# Patient Record
Sex: FEMALE | Race: WHITE | NOT HISPANIC OR LATINO | Employment: UNEMPLOYED | URBAN - NONMETROPOLITAN AREA
[De-identification: names, ages, dates, MRNs, and addresses within clinical notes are randomized per-mention and may not be internally consistent; named-entity substitution may affect disease eponyms.]

---

## 2017-07-11 ENCOUNTER — TELEPHONE (OUTPATIENT)
Dept: SURGERY | Facility: CLINIC | Age: 57
End: 2017-07-11

## 2017-07-14 ENCOUNTER — PREP FOR SURGERY (OUTPATIENT)
Dept: OTHER | Facility: HOSPITAL | Age: 57
End: 2017-07-14

## 2017-07-14 DIAGNOSIS — Z12.11 ENCOUNTER FOR SCREENING COLONOSCOPY: Primary | ICD-10-CM

## 2017-07-14 NOTE — TELEPHONE ENCOUNTER
Pt is scheduled for colonoscopy screening on August 14, 2017 at [facility]. Instructions mailed today. Thanks.

## 2018-01-23 ENCOUNTER — TELEPHONE (OUTPATIENT)
Dept: SURGERY | Facility: CLINIC | Age: 58
End: 2018-01-23

## 2018-01-26 ENCOUNTER — PREP FOR SURGERY (OUTPATIENT)
Dept: OTHER | Facility: HOSPITAL | Age: 58
End: 2018-01-26

## 2018-01-26 DIAGNOSIS — Z12.11 ENCOUNTER FOR SCREENING COLONOSCOPY: Primary | ICD-10-CM

## 2018-02-22 ENCOUNTER — TELEPHONE (OUTPATIENT)
Dept: SURGERY | Facility: CLINIC | Age: 58
End: 2018-02-22

## 2018-02-22 NOTE — TELEPHONE ENCOUNTER
Madeleine    ([facility] ) called saying patient needs to reschedule her surgery that is scheduled 02/26/18 due to her being sick. She will violette back and reschedule at a later date.

## 2018-03-14 ENCOUNTER — TELEPHONE (OUTPATIENT)
Dept: SURGERY | Facility: CLINIC | Age: 58
End: 2018-03-14

## 2018-04-17 ENCOUNTER — TELEPHONE (OUTPATIENT)
Dept: SURGERY | Facility: CLINIC | Age: 58
End: 2018-04-17

## 2018-04-17 RX ORDER — POLYETHYLENE GLYCOL 3350 17 G/17G
238 POWDER, FOR SOLUTION ORAL ONCE
Qty: 14 PACKET | Refills: 0 | Status: SHIPPED | OUTPATIENT
Start: 2018-04-17 | End: 2018-04-17

## 2018-04-17 RX ORDER — BISACODYL 5 MG/1
5 TABLET, DELAYED RELEASE ORAL DAILY
Qty: 4 TABLET | Refills: 0 | Status: SHIPPED | OUTPATIENT
Start: 2018-04-17

## 2018-05-14 ENCOUNTER — TELEPHONE (OUTPATIENT)
Dept: SURGERY | Facility: CLINIC | Age: 58
End: 2018-05-14

## 2018-06-08 ENCOUNTER — TELEPHONE (OUTPATIENT)
Dept: SURGERY | Facility: CLINIC | Age: 58
End: 2018-06-08

## 2018-06-25 ENCOUNTER — TELEPHONE (OUTPATIENT)
Dept: SURGERY | Facility: CLINIC | Age: 58
End: 2018-06-25

## 2018-06-25 RX ORDER — BISACODYL 5 MG/1
5 TABLET, DELAYED RELEASE ORAL DAILY PRN
Qty: 4 TABLET | Refills: 0 | Status: SHIPPED | OUTPATIENT
Start: 2018-06-25 | End: 2018-06-26

## 2018-06-25 RX ORDER — POLYETHYLENE GLYCOL 3350 17 G/17G
238 POWDER, FOR SOLUTION ORAL ONCE
Qty: 14 PACKET | Refills: 0 | Status: SHIPPED | OUTPATIENT
Start: 2018-06-25 | End: 2018-06-25

## 2018-06-25 NOTE — TELEPHONE ENCOUNTER
Patient called requesting to reschedule colonoscopy. She is down for 7/16/18 at [facility]. I will send the prep to [facility] and mail the patient instructions.

## 2018-06-26 ENCOUNTER — PREP FOR SURGERY (OUTPATIENT)
Dept: OTHER | Facility: HOSPITAL | Age: 58
End: 2018-06-26

## 2018-06-26 DIAGNOSIS — Z12.11 ENCOUNTER FOR SCREENING COLONOSCOPY: Primary | ICD-10-CM

## 2018-08-22 ENCOUNTER — TELEPHONE (OUTPATIENT)
Dept: SURGERY | Facility: CLINIC | Age: 58
End: 2018-08-22

## 2018-08-27 ENCOUNTER — TELEPHONE (OUTPATIENT)
Dept: SURGERY | Facility: CLINIC | Age: 58
End: 2018-08-27

## 2022-08-08 NOTE — TELEPHONE ENCOUNTER
I called and left another message.  
I called and left another message.  Letter mailed.  
Veronica spoke to [facility] and they said the patient wants to cancel.  I tried to call and left a message on voicemail.  
Walk in

## 2024-05-22 PROBLEM — M41.9 SCOLIOSIS: Status: ACTIVE | Noted: 2024-05-22

## 2024-05-22 PROBLEM — G62.9 NEUROPATHY: Chronic | Status: ACTIVE | Noted: 2024-05-22

## 2024-05-22 PROBLEM — M17.0 PRIMARY OSTEOARTHRITIS OF BOTH KNEES: Chronic | Status: ACTIVE | Noted: 2024-05-22

## 2024-05-22 PROBLEM — R52 PAIN MANAGEMENT: Chronic | Status: ACTIVE | Noted: 2024-05-22

## 2024-05-22 PROBLEM — Z79.899 ENCOUNTER FOR LONG-TERM (CURRENT) USE OF HIGH-RISK MEDICATION: Chronic | Status: ACTIVE | Noted: 2024-05-22

## 2024-05-22 PROBLEM — M05.79 SEROPOSITIVE RHEUMATOID ARTHRITIS OF MULTIPLE SITES: Chronic | Status: ACTIVE | Noted: 2024-05-22

## 2024-05-22 PROBLEM — R53.82 CHRONIC FATIGUE: Status: ACTIVE | Noted: 2024-05-22

## 2024-05-23 ENCOUNTER — OFFICE VISIT (OUTPATIENT)
Age: 64
End: 2024-05-23
Payer: MEDICAID

## 2024-05-23 VITALS
DIASTOLIC BLOOD PRESSURE: 82 MMHG | HEART RATE: 66 BPM | SYSTOLIC BLOOD PRESSURE: 130 MMHG | BODY MASS INDEX: 19.08 KG/M2 | TEMPERATURE: 97.9 F | HEIGHT: 68 IN | WEIGHT: 125.9 LBS

## 2024-05-23 DIAGNOSIS — Z79.899 ENCOUNTER FOR LONG-TERM (CURRENT) USE OF HIGH-RISK MEDICATION: Chronic | ICD-10-CM

## 2024-05-23 DIAGNOSIS — M05.79 SEROPOSITIVE RHEUMATOID ARTHRITIS OF MULTIPLE SITES: Primary | Chronic | ICD-10-CM

## 2024-05-23 DIAGNOSIS — R53.82 CHRONIC FATIGUE: ICD-10-CM

## 2024-05-23 DIAGNOSIS — R52 PAIN MANAGEMENT: Chronic | ICD-10-CM

## 2024-05-23 DIAGNOSIS — M41.20 OTHER IDIOPATHIC SCOLIOSIS, UNSPECIFIED SPINAL REGION: ICD-10-CM

## 2024-05-23 DIAGNOSIS — M17.0 PRIMARY OSTEOARTHRITIS OF BOTH KNEES: Chronic | ICD-10-CM

## 2024-05-23 DIAGNOSIS — G62.9 NEUROPATHY: Chronic | ICD-10-CM

## 2024-05-23 RX ORDER — TRAMADOL HYDROCHLORIDE 50 MG/1
TABLET ORAL
Qty: 180 TABLET | Refills: 2 | Status: SHIPPED | OUTPATIENT
Start: 2024-05-23

## 2024-05-23 RX ORDER — MELOXICAM 15 MG/1
15 TABLET ORAL DAILY
Qty: 90 TABLET | Refills: 1 | Status: SHIPPED | OUTPATIENT
Start: 2024-05-23

## 2024-05-23 RX ORDER — CERTOLIZUMAB PEGOL 400 MG
KIT SUBCUTANEOUS
Qty: 2 ML | Refills: 4 | Status: SHIPPED | OUTPATIENT
Start: 2024-05-23

## 2024-05-23 NOTE — ASSESSMENT & PLAN NOTE
Cimzia    - UDS ordered today 06/15/23  - Labs including CBC, CMP, ESR, and CRP every 6 months  - QTB and hepatitis panel - 12/21/2023.  Reviewed in [employer] today.

## 2024-05-23 NOTE — PROGRESS NOTES
Office Visit       Date: 05/23/2024   Patient Name: Angelika De Jesus  MRN: [id number]  YOB: 1960    Referring Physician: Zeyad Small MD     Chief Complaint:   Chief Complaint   Patient presents with    Arthritis    Med Refill       History of Present Illness: Angelika De Jesus is a 63 y.o. female who is here today with rheumatoid arthritis.  Today she reports feeling better.  She rates her pain 2 out of 10 and has 2 hours of morning stiffness.  She has had muscle spasms in her back and legs.      Subjective   Review of Systems:  Review of Systems   Musculoskeletal:  Positive for arthralgias, back pain and myalgias.   Skin:         Itching          Past Medical History:   Past Medical History:   Diagnosis Date    Back pain     Chronic fatigue     H/O high risk medication treatment     Long-term use of immunosuppressant medication     Pain management     Rheumatoid arthritis     Thoracic back pain        Past Surgical History: History reviewed. No pertinent surgical history.    Family History: History reviewed. No pertinent family history.    Social History:   Social History     Socioeconomic History    Marital status: Single   Tobacco Use    Smoking status: Never   Substance and Sexual Activity    Alcohol use: Never       Medications:   Current Outpatient Medications:     albuterol sulfate  (90 Base) MCG/ACT inhaler, Inhale 2 puffs Every 4 (Four) to 6 (Six) Hours. As needed, Disp: , Rfl:     atorvastatin (LIPITOR) 10 MG tablet, Take 1 tablet by mouth Daily., Disp: , Rfl:     bisacodyl (bisacodyl) 5 MG EC tablet, Take 1 tablet by mouth Daily., Disp: 4 tablet, Rfl: 0    Certolizumab Pegol (Cimzia) 2 X 200 MG kit injection, Inject 2mL subcutaneous every 4 weeks., Disp: 2 mL, Rfl: 4    cetirizine (ZyrTEC Allergy) 10 MG tablet, Take 1 tablet by mouth Daily., Disp: , Rfl:     Diclofenac Sodium (VOLTAREN) 1 % gel gel, Apply 2 g topically to the appropriate area as directed 2 (Two)  "Times a Day., Disp: , Rfl:     fluticasone (Flonase Allergy Relief) 50 MCG/ACT nasal spray, 1-2 sprays into the nostril(s) as directed by provider Daily., Disp: , Rfl:     gabapentin (NEURONTIN) 600 MG tablet, Take 1 tablet by mouth 3 (Three) Times a Day., Disp: , Rfl:     ipratropium (ATROVENT) 0.06 % nasal spray, 2 sprays into the nostril(s) as directed by provider 3 (Three) Times a Day. As needed, Disp: , Rfl:     lisinopril (PRINIVIL,ZESTRIL) 5 MG tablet, Take 1 tablet by mouth Daily., Disp: , Rfl:     meloxicam (MOBIC) 15 MG tablet, Take 1 tablet by mouth Daily., Disp: 90 tablet, Rfl: 1    montelukast (SINGULAIR) 10 MG tablet, Take 1 tablet by mouth Every Night., Disp: , Rfl:     multivitamin (MULTI VITAMIN PO), Take  by mouth Daily., Disp: , Rfl:     oxybutynin XL (Ditropan XL) 10 MG 24 hr tablet, Take 1 tablet by mouth Daily., Disp: , Rfl:     pantoprazole (PROTONIX) 40 MG EC tablet, Take 1 tablet by mouth Daily., Disp: , Rfl:     sertraline (Zoloft) 100 MG tablet, Take 1 tablet by mouth Daily., Disp: , Rfl:     traMADol (ULTRAM) 50 MG tablet, Take 1-2 tablets po TID prn pain, Disp: 180 tablet, Rfl: 2    Allergies: No Known Allergies    I have reviewed and updated the patient's chief complaint, history of present illness, review of systems, past medical history, surgical history, family history, social history, medications and allergy list as appropriate.     Objective    Vital Signs:   Vitals:    05/23/24 0941   BP: 130/82   BP Location: Left arm   Patient Position: Sitting   Cuff Size: Adult   Pulse: 66   Temp: 97.9 °F (36.6 °C)   TempSrc: Temporal   Weight: 57.1 kg (125 lb 14.4 oz)   Height: 172.7 cm (68\")   PainSc: 0-No pain     Body mass index is 19.14 kg/m².   BMI is within normal parameters. No other follow-up for BMI required.       Physical Exam:  Physical Exam  Vitals reviewed.   Constitutional:       Appearance: Normal appearance.   HENT:      Head: Normocephalic and atraumatic.      Mouth/Throat: "      Mouth: Mucous membranes are moist.   Eyes:      Conjunctiva/sclera: Conjunctivae normal.   Cardiovascular:      Rate and Rhythm: Normal rate and regular rhythm.      Pulses: Normal pulses.      Heart sounds: Normal heart sounds.   Pulmonary:      Effort: Pulmonary effort is normal.      Breath sounds: Normal breath sounds.   Musculoskeletal:         General: Normal range of motion.      Cervical back: Normal range of motion and neck supple.      Comments: Crepitus bilateral knees  OA changes in bilateral DIP's.     Skin:     General: Skin is warm and dry.      Comments: Spider veins upper legs   Neurological:      General: No focal deficit present.      Mental Status: She is alert and oriented to person, place, and time. Mental status is at baseline.   Psychiatric:         Mood and Affect: Mood normal.         Behavior: Behavior normal.         Thought Content: Thought content normal.         Judgment: Judgment normal.          Results Review:   Imaging Results (Last 24 Hours)       ** No results found for the last 24 hours. **            Procedures    Assessment / Plan    Assessment/Plan:   Diagnoses and all orders for this visit:    1. Seropositive rheumatoid arthritis of multiple sites (Primary)  Assessment & Plan:  DX Dr Jacob RA 2007- started pred and plaquenil. Rheumatoid Factor >100.   1/2007 MTX  6/2008 Humira and dc plaquenil  2010-Humira was from the company and she stopped methotrexate because she couldn't afford it.  Started low-dose prednisone for a short time.  Restarted mtx.  8/2010 dc Humira- rash.    Arava was stopped in 2017 due to elevated LFTS    CURRENT TREATMENT : CIMZIA subQ monthly (about 2017)    - continue Cimzia monthly at home injections.  - No synovitis today.    -Reviewed labs with [employer] that were drawn 12/21/2023.  Labs stable    Orders:  -     Certolizumab Pegol (Cimzia) 2 X 200 MG kit injection; Inject 2mL subcutaneous every 4 weeks.  Dispense: 2 mL; Refill: 4  -     meloxicam  (MOBIC) 15 MG tablet; Take 1 tablet by mouth Daily.  Dispense: 90 tablet; Refill: 1  -     traMADol (ULTRAM) 50 MG tablet; Take 1-2 tablets po TID prn pain  Dispense: 180 tablet; Refill: 2  -     CBC With Manual Differential; Future  -     Comprehensive Metabolic Panel; Future  -     C-reactive Protein; Future  -     Sedimentation Rate; Future    2. Encounter for long-term (current) use of high-risk medication  Assessment & Plan:  Cimzia    - UDS ordered today 06/15/23  - Labs including CBC, CMP, ESR, and CRP every 6 months  - QTB and hepatitis panel - 12/21/2023.  Reviewed in [employer] today.    Orders:  -     CBC With Manual Differential; Future  -     Comprehensive Metabolic Panel; Future  -     C-reactive Protein; Future  -     Sedimentation Rate; Future    3. Primary osteoarthritis of both knees  Assessment & Plan:  Left worse right  - She has moved to a one story house.  - we referred her to PT but she did not go  - continue tramadol, meloxicam, and Tylenol for pain  - Also using topical Voltaren gel    Orders:  -     meloxicam (MOBIC) 15 MG tablet; Take 1 tablet by mouth Daily.  Dispense: 90 tablet; Refill: 1  -     traMADol (ULTRAM) 50 MG tablet; Take 1-2 tablets po TID prn pain  Dispense: 180 tablet; Refill: 2    4. Pain management  Assessment & Plan:  Tramadol    UDS ordered today.  Update pain contract today with [facility]  PDMP reviewed  No signs of abuse or diversion  Will address pain management at every visit    Orders:  -     Urine Drug Screen - Urine, Clean Catch; Future    5. Neuropathy  Assessment & Plan:  ?secondary sjogrens/RA could cause some neuropathy.    Rarely, cimzia can contribute to a peripheral neuropathy  She does not have known diabetes.  She does have some DDD in her lumbar spine    - She previously was going to have an EMG with her PCP. She reports she did not ever have this done. She declined order for EMG today  - she does have a positive SSA with sicca symptoms  -  already on gabapentin  -B12 was 735 and folate greater than 20 on 12/21/2023 at last visit.  Labs reviewed today          6. Chronic fatigue  Assessment & Plan:  Fatigue continues to be a problem      7. Other idiopathic scoliosis, unspecified spinal region  Assessment & Plan:  She went to two PT appointments.  Reorder today    Orders:  -     Ambulatory Referral to Physical Therapy for Evaluation & Treatment        Follow Up:   Return in about 4 months (around 9/23/2024) for Dr. Lovelace.        LAURA Lagunas  [facility]

## 2024-05-23 NOTE — ASSESSMENT & PLAN NOTE
?secondary sjogrens/RA could cause some neuropathy.    Rarely, cimzia can contribute to a peripheral neuropathy  She does not have known diabetes.  She does have some DDD in her lumbar spine    - She previously was going to have an EMG with her PCP. She reports she did not ever have this done. She declined order for EMG today  - she does have a positive SSA with sicca symptoms  - already on gabapentin  -B12 was 735 and folate greater than 20 on 12/21/2023 at last visit.  Labs reviewed today

## 2024-05-23 NOTE — ASSESSMENT & PLAN NOTE
Tramadol    UDS ordered today.  Update pain contract today with [facility]  PDMP reviewed  No signs of abuse or diversion  Will address pain management at every visit

## 2024-05-23 NOTE — ASSESSMENT & PLAN NOTE
DX Dr Jacob RA 2007- started pred and plaquenil. Rheumatoid Factor >100.   1/2007 MTX  6/2008 Humira and dc plaquenil  2010-Humira was from the company and she stopped methotrexate because she couldn't afford it.  Started low-dose prednisone for a short time.  Restarted mtx.  8/2010 dc Humira- rash.    Arava was stopped in 2017 due to elevated LFTS    CURRENT TREATMENT : CIMZIA subQ monthly (about 2017)    - continue Cimzia monthly at home injections.  - No synovitis today.    -Reviewed labs with [employer] that were drawn 12/21/2023.  Labs stable

## 2024-05-23 NOTE — ASSESSMENT & PLAN NOTE
Left worse right  - She has moved to a one story house.  - we referred her to PT but she did not go  - continue tramadol, meloxicam, and Tylenol for pain  - Also using topical Voltaren gel

## 2024-06-05 ENCOUNTER — TELEPHONE (OUTPATIENT)
Age: 64
End: 2024-06-05
Payer: MEDICAID

## 2024-06-05 DIAGNOSIS — M05.79 SEROPOSITIVE RHEUMATOID ARTHRITIS OF MULTIPLE SITES: Chronic | ICD-10-CM

## 2024-06-07 RX ORDER — CERTOLIZUMAB PEGOL 400 MG
KIT SUBCUTANEOUS
Qty: 2 ML | Refills: 4 | Status: SHIPPED | OUTPATIENT
Start: 2024-06-07

## 2024-06-07 NOTE — TELEPHONE ENCOUNTER
Rx Refill Note  Requested Prescriptions     Pending Prescriptions Disp Refills    Certolizumab Pegol (Cimzia) 2 X 200 MG kit injection 2 mL 4     Sig: Inject 2mL subcutaneous every 4 weeks.      Last office visit with prescribing clinician: 5/23/2024   Last telemedicine visit with prescribing clinician: Visit date not found   Next office visit with prescribing clinician: Visit date not found                         Would you like a call back once the refill request has been completed: [] Yes [] No    If the office needs to give you a call back, can they leave a voicemail: [] Yes [] No    Manuela Mott MA  06/07/24, 10:17 EDT

## 2024-06-10 ENCOUNTER — TELEPHONE (OUTPATIENT)
Age: 64
End: 2024-06-10

## 2024-06-10 NOTE — TELEPHONE ENCOUNTER
ALISHA AT [facility] CALLED REGARDING THE PTS DEONA.  YOU CAN SPEAK TO ANY PHARMACISTS REGARDING THIS.  PH # [phone]

## 2024-06-11 ENCOUNTER — SPECIALTY PHARMACY (OUTPATIENT)
Age: 64
End: 2024-06-11
Payer: MEDICAID

## 2024-06-11 ENCOUNTER — TELEPHONE (OUTPATIENT)
Age: 64
End: 2024-06-11
Payer: MEDICAID

## 2024-06-11 NOTE — TELEPHONE ENCOUNTER
Called patient we can fill her Cimzia for her. I called george had them cancel all other RX. We need to double check the spelling of her name as they had two accounts. For her. Next fill 06/18/2024. Cimzia kit with $0 copay

## 2024-06-18 ENCOUNTER — SPECIALTY PHARMACY (OUTPATIENT)
Dept: GENERAL RADIOLOGY | Facility: HOSPITAL | Age: 64
End: 2024-06-18
Payer: MEDICAID

## 2024-06-18 DIAGNOSIS — M05.79 SEROPOSITIVE RHEUMATOID ARTHRITIS OF MULTIPLE SITES: Chronic | ICD-10-CM

## 2024-06-18 RX ORDER — CERTOLIZUMAB PEGOL 400 MG
KIT SUBCUTANEOUS
Qty: 2 ML | Refills: 4 | Status: SHIPPED | OUTPATIENT
Start: 2024-06-18

## 2024-06-18 NOTE — PROGRESS NOTES
Specialty Pharmacy Patient Management Program  Rheumatology Initial Assessment     Angelika De Jesus was referred by an Rheumatology provider to the Rheumatology Patient Management program offered by [facility] for Rheumatoid Arthritis on 06/18/24.  An initial outreach was conducted, including assessment of therapy appropriateness and specialty medication education for Cimzia. The patient was introduced to services offered by [facility], including: regular assessments, refill coordination, curbside pick-up or mail order delivery options, prior authorization maintenance, and financial assistance programs as applicable. The patient was also provided with contact information for the pharmacy team.     Insurance Coverage & Financial Support  [place] Medicaid     Relevant Past Medical History and Comorbidities  Relevant medical history and concomitant health conditions were discussed with the patient. The patient's chart has been reviewed for relevant past medical history and comorbid conditions and updated as necessary.  Past Medical History:   Diagnosis Date    Back pain     Chronic fatigue     H/O high risk medication treatment     Long-term use of immunosuppressant medication     Pain management     Rheumatoid arthritis     Thoracic back pain      Social History     Socioeconomic History    Marital status: Single   Tobacco Use    Smoking status: Never   Substance and Sexual Activity    Alcohol use: Never       Problem list reviewed by Jose Stanton, Clay on 6/18/2024 at  1:40 PM    Allergies  Known allergies and reactions were discussed with the patient. The patient's chart has been reviewed for  allergy information and updated as necessary.   No Known Allergies    Allergies reviewed by Jose Stanton, Clay on 6/18/2024 at  1:40 PM    Relevant Laboratory Values  Relevant laboratory values were discussed with the patient. The following specialty  "medication dose adjustment(s) are recommended: n/a    No results found for: \"HGBA1C\"  No results found for: \"GLUCOSE\", \"CALCIUM\", \"NA\", \"K\", \"CO2\", \"CL\", \"BUN\", \"CREATININE\", \"EGFRIFAFRI\", \"EGFRIFNONA\", \"BCR\", \"ANIONGAP\"  No results found for: \"CHOL\", \"CHLPL\", \"TRIG\", \"HDL\", \"LDL\", \"LDLDIRECT\"      Current Medication List  This medication list has been reviewed with the patient and evaluated for any interactions or necessary modifications/recommendations, and updated to include all prescription medications, OTC medications, and supplements the patient is currently taking.  This list reflects what is contained in the patient's profile, which has also been marked as reviewed to communicate to other providers it is the most up to date version of the patient's current medication therapy.     Current Outpatient Medications:     Certolizumab Pegol (Cimzia) 2 X 200 MG kit injection, Inject 2mL subcutaneous every 4 weeks., Disp: 2 mL, Rfl: 4    albuterol sulfate  (90 Base) MCG/ACT inhaler, Inhale 2 puffs Every 4 (Four) to 6 (Six) Hours. As needed, Disp: , Rfl:     atorvastatin (LIPITOR) 10 MG tablet, Take 1 tablet by mouth Daily., Disp: , Rfl:     bisacodyl (bisacodyl) 5 MG EC tablet, Take 1 tablet by mouth Daily., Disp: 4 tablet, Rfl: 0    cetirizine (ZyrTEC Allergy) 10 MG tablet, Take 1 tablet by mouth Daily., Disp: , Rfl:     Diclofenac Sodium (VOLTAREN) 1 % gel gel, Apply 2 g topically to the appropriate area as directed 2 (Two) Times a Day., Disp: , Rfl:     fluticasone (Flonase Allergy Relief) 50 MCG/ACT nasal spray, 1-2 sprays into the nostril(s) as directed by provider Daily., Disp: , Rfl:     gabapentin (NEURONTIN) 600 MG tablet, Take 1 tablet by mouth 3 (Three) Times a Day., Disp: , Rfl:     ipratropium (ATROVENT) 0.06 % nasal spray, 2 sprays into the nostril(s) as directed by provider 3 (Three) Times a Day. As needed, Disp: , Rfl:     lisinopril (PRINIVIL,ZESTRIL) 5 MG tablet, Take 1 tablet by mouth " Daily., Disp: , Rfl:     meloxicam (MOBIC) 15 MG tablet, Take 1 tablet by mouth Daily., Disp: 90 tablet, Rfl: 1    montelukast (SINGULAIR) 10 MG tablet, Take 1 tablet by mouth Every Night., Disp: , Rfl:     multivitamin (MULTI VITAMIN PO), Take  by mouth Daily., Disp: , Rfl:     oxybutynin XL (Ditropan XL) 10 MG 24 hr tablet, Take 1 tablet by mouth Daily., Disp: , Rfl:     pantoprazole (PROTONIX) 40 MG EC tablet, Take 1 tablet by mouth Daily., Disp: , Rfl:     sertraline (Zoloft) 100 MG tablet, Take 1 tablet by mouth Daily., Disp: , Rfl:     traMADol (ULTRAM) 50 MG tablet, Take 1-2 tablets po TID prn pain, Disp: 180 tablet, Rfl: 2    Medicines reviewed by Jose Stanton, PharmD on 6/18/2024 at  1:40 PM    Drug Interactions  N/a    Initial Education Provided for Specialty Medication  The patient has been provided with the following education and any applicable administration techniques (i.e. self-injection) have been demonstrated for the therapies indicated. All questions and concerns have been addressed prior to the patient receiving the medication, and the patient has verbalized comprehension of the education and any materials provided. Additional patient education shall be provided and documented upon request by the patient, provider, or payer.       Cimzia (certolizumab pegol)        Medication Expectations   Why am I taking this medication? You are taking this medication for plaque psoriasis, psoriatic arthritis, ankylosing spondylitis, nonradiographic axial spondyloarthritis, or Crohn's disease.   What should I expect while on this medication? You should expect a decrease in the frequency and severity of symptoms.   How does the medication work? Certolizumab pegol is a monoclonal antibody that binds to and neutralizes tumor necrosis factor alpha (TNF-alpha).   How long will I be on this medication for? The amount of time you will be on this medication will be determined by your doctor and your response  to the medication.    How do I take this medication? Take as directed on your prescription label. This medication is a subcutaneous injection given in the fatty part of the skin on the top of the thigh or stomach area. Allow to sit at room temperature for 30 minutes prior to injection.   What are some possible side effects? Injection site reactions and hypersensitivity reactions, headache, diarrhea, signs of a common cold, stomach pain, or upset stomach.   What happens if I miss a dose? If you miss a dose, take it as soon as you remember. If it is close to the time for your next dose, skip the missed dose and go back to your normal time.              Medication Safety   What are things I should warn my doctor immediately about? Allergic reaction such has hives or trouble breathing. If you develop symptoms of infection such as a cough that does not go away, weight loss, changes in how often you urinate, changes in sweating, muscle pain or weakness, or severe dizziness.     What are things that I should be cautious of? Injection site reaction and headache. You may have more chance of getting an infection.  Wash your hands often and stay away from people with infections, colds, or flu.   What are some medications that can interact with this one? Immunosuppressants and vaccines.            Medication Storage/Handling   How should I handle this medication? Keep this medication our of reach of pets/children in original container. Store in the original container to protect from light. Do not freeze. Do not inject where the skin is tender, bruised, red, hard, or has scars or stretch marks. Rotate injection sites.   How does this medication need to be stored? Store in refrigerator and keep dry. If needed, you may store at room temperature for up to 7 days but do not return to the refrigerator if unused.    How should I dispose of this medication? You can dispose of the syringe in a sharps container, and if this is not  available you may use an empty hard plastic container such as a milk jug or tide container. Discard any unused portion or if stored outside of refrigerator > 7 days.            Resources/Support   How can I remind myself to take this medication? You can download a reminder carlos on your phone or use a calandar  to help with your injection.   Is financial support available?  Yes, [facility] program can provide co-pay assisstance if you have commercial insurance or patient assistance if you have Medicare or no insurance.    Which vaccines are recommended for me? Talk to your doctor about these vaccines: Flu, Coronavirus (COVID-19), Pneumococcal (pneumonia), Tdap, Hepatitis B, Zoster (shingles).              Adherence and Self-Administration  Adherence related to the patient's specialty therapy was discussed with the patient. The Adherence segment of this outreach has been reviewed and updated.     Is there a concern with patient's ability to self administer the medication correctly and without issue?: No  Were any potential barriers to adherence identified during the initial assessment or patient education?: No  Are there any concerns regarding the patient's understanding of the importance of medication adherence?: No  Methods for Supporting Patient Adherence and/or Self-Administration: n/a    Open Medication Therapy Problems  No medication therapy recommendations to display    Goals of Therapy  Goals related to the patient's specialty therapy were discussed with the patient. The Patient Goals segment of this outreach has been reviewed and updated.   Goals Addressed Today    None       Reassessment Plan & Follow-Up  1. Medication Therapy Changes: Cimzia 400mg subq every 4 weeks  2. Related Plans, Therapy Recommendations, or Therapy Problems to Be Addressed: Patient currently on therapy and does not have any questions or concerns.  Patient transitioning to [facility] and aware she must sign for delivery.   Advised patient to call direct line with any questions or concerns. Patient next dose due on 6.25.  3. Pharmacist to perform regular assessments no more than (6) months from the previous assessment.  4. Care Coordinator to set up future refill outreaches, coordinate prescription delivery, and escalate clinical questions to pharmacist.  5. Welcome information and patient satisfaction survey to be sent by specialty pharmacy team with patient's initial fill.    Attestation  Therapeutic appropriateness: Appropriate   I attest the patient was actively involved in and has agreed to the above plan of care. If the prescribed therapy is at any point deemed not appropriate based on the current or future assessments, a consultation will be initiated with the patient's specialty care provider to determine the best course of action. The revised plan of therapy will be documented along with any required assessments and/or additional patient education provided.     Jose Stanton, PharmD  Clinical Specialty Pharmacist, Rheumatology  6/18/2024  13:45 EDT

## 2024-07-01 ENCOUNTER — SPECIALTY PHARMACY (OUTPATIENT)
Dept: GENERAL RADIOLOGY | Facility: HOSPITAL | Age: 64
End: 2024-07-01
Payer: MEDICAID

## 2024-07-01 ENCOUNTER — SPECIALTY PHARMACY (OUTPATIENT)
Age: 64
End: 2024-07-01
Payer: MEDICAID

## 2024-07-01 RX ORDER — CERTOLIZUMAB PEGOL 200 MG/ML
400 INJECTION, SOLUTION SUBCUTANEOUS
Qty: 2 ML | Refills: 2 | Status: SHIPPED | OUTPATIENT
Start: 2024-07-01

## 2024-07-11 DIAGNOSIS — M05.79 SEROPOSITIVE RHEUMATOID ARTHRITIS OF MULTIPLE SITES: Chronic | ICD-10-CM

## 2024-07-11 DIAGNOSIS — M17.0 PRIMARY OSTEOARTHRITIS OF BOTH KNEES: Chronic | ICD-10-CM

## 2024-07-11 NOTE — TELEPHONE ENCOUNTER
PT IS CALLING TO GET A REFILL ON HER TRAMADOL 50 MG    PT IS ALSO REQUESTING A REFILL ON MELOXICAM (MOBIC) 15 MG    PHARMACY IS ON FILE

## 2024-07-15 RX ORDER — TRAMADOL HYDROCHLORIDE 50 MG/1
TABLET ORAL
Qty: 180 TABLET | Refills: 2 | Status: SHIPPED | OUTPATIENT
Start: 2024-07-15

## 2024-07-15 RX ORDER — MELOXICAM 15 MG/1
15 TABLET ORAL DAILY
Qty: 90 TABLET | Refills: 1 | Status: SHIPPED | OUTPATIENT
Start: 2024-07-15

## 2024-08-16 ENCOUNTER — SPECIALTY PHARMACY (OUTPATIENT)
Age: 64
End: 2024-08-16
Payer: MEDICAID

## 2024-08-19 ENCOUNTER — TELEPHONE (OUTPATIENT)
Age: 64
End: 2024-08-19
Payer: MEDICAID

## 2024-08-21 ENCOUNTER — TELEPHONE (OUTPATIENT)
Age: 64
End: 2024-08-21
Payer: MEDICAID

## 2024-08-21 NOTE — TELEPHONE ENCOUNTER
File Link    Scan on 8/21/2024 1355 by [employer] [employer]: tramadol PA 8/19/24        Key Information    Document ID File Type Document Type Description   [id number] Image REFERRAL/PRE-AUTH CSN - SCAN tramadol PA 8/19/24     Import Information    Attached At Date Time User Dept   Encounter Level 8/21/2024  1:55 PM [employer]      Encounter    Scanned Document on 8/19/24 with [employer]

## 2024-08-23 ENCOUNTER — TELEPHONE (OUTPATIENT)
Age: 64
End: 2024-08-23
Payer: MEDICAID

## 2024-08-23 NOTE — TELEPHONE ENCOUNTER
Caller: Angelika De Jesus    Relationship to patient: Self    Best call back number: [phone]    Patient is needing: INSURANCE CALLED PT AND STATED PA WASN'T FILLED OUT CORRECTLY. PLEASE CALL [phone]  PT CALLED AGAIN AND WOULD LIKE TO SEE IF SOMETHING CAN BE SENT UNTIL THIS IS DONE.

## 2024-08-23 NOTE — TELEPHONE ENCOUNTER
Spoke with [facility] [employer] and was told that PA had been closed and we could not still send questions that had been faxed. Tried to submit new PA as was suggested by them but was unable to do in CMM. Appeal submitted in CMM answering the questions that were faxed.

## 2024-08-23 NOTE — TELEPHONE ENCOUNTER
Spoke with [facility] [employer] and was told that PA had been closed and we could not still send questions that had been faxed. Tried to submit new PA as was suggested by them but was unable to do in CMM. Appeal submitted in CMM answering the questions that were faxed.

## 2024-08-26 ENCOUNTER — TELEPHONE (OUTPATIENT)
Age: 64
End: 2024-08-26
Payer: MEDICAID

## 2024-09-03 ENCOUNTER — SPECIALTY PHARMACY (OUTPATIENT)
Age: 64
End: 2024-09-03
Payer: MEDICAID

## 2024-09-03 NOTE — PROGRESS NOTES
Specialty Pharmacy Refill Coordination Note     Angelika is a 63 y.o. female contacted today regarding refills of  ENBREL specialty medication(s).    Reviewed and verified with patient:     YES  Specialty medication(s) and dose(s) confirmed: yes    Refill Questions      Flowsheet Row Most Recent Value   Changes to allergies? No   Changes to medications? No   New conditions or infections since last clinic visit No   Unplanned office visit, urgent care, ED, or hospital admission in the last 4 weeks  No   How does patient/caregiver feel medication is working? Good   Financial problems or insurance changes  No   Since the previous refill, were any specialty medication doses or scheduled injections missed or delayed?  No   Does this patient require a clinical escalation to a pharmacist? No            Delivery Questions      Flowsheet Row Most Recent Value   Delivery method UPS   Delivery address verified with patient/caregiver? Yes   Delivery address Home   Number of medications in delivery 1   Medication(s) being filled and delivered --  [eNBREL]   Doses left of specialty medications 1 PEN   Copay verified? Yes   Copay amount 54.38   Copay form of payment Credit/debit on file   Ship Date 09/3/24   Delivery Date 09/04/24   Signature Required No                   Follow-up: 21 day(s)     Maya Marks, Pharmacy Technician  Specialty Pharmacy Technician

## 2024-09-05 ENCOUNTER — TELEPHONE (OUTPATIENT)
Age: 64
End: 2024-09-05
Payer: MEDICAID

## 2024-09-05 RX ORDER — CERTOLIZUMAB PEGOL 200 MG/ML
400 INJECTION, SOLUTION SUBCUTANEOUS
Qty: 2 ML | Refills: 2 | OUTPATIENT
Start: 2024-09-05

## 2024-09-05 NOTE — TELEPHONE ENCOUNTER
Caller: Angelika De Jesus    Relationship: Self    Best call back number: [phone]    Requested Prescriptions:   Requested Prescriptions     Pending Prescriptions Disp Refills    Certolizumab Pegol (Cimzia, 2 Syringe,) 200 MG/ML Prefilled Syringe Kit injection 2 mL 2     Sig: Inject 2 mL under the skin into the appropriate area as directed Every 28 (Twenty-Eight) Days.        Pharmacy where request should be sent:    [facility]  [address] [place] [address], [place] [address]  Phone: [phone]  Fax: [phone]     Last office visit with prescribing clinician: Visit date not found   Last telemedicine visit with prescribing clinician: Visit date not found   Next office visit with prescribing clinician: 9/26/2024     Additional details provided by patient: PT IS COMPLETELY OUT    Does the patient have less than a 3 day supply:  [x] Yes  [] No    Would you like a call back once the refill request has been completed: [] Yes [] No    If the office needs to give you a call back, can they leave a voicemail: [] Yes [] No    Yordy Julio Rep   09/05/24 10:39 EDT

## 2024-09-05 NOTE — TELEPHONE ENCOUNTER
Patient already has refills. [facility] is attempting to get ahold of the patient to schedule her refill to be shipped.    Susanne Matias, PharmD, BCPS  Clinical Specialty Pharmacist, Rheumatology   9/5/2024  10:57 EDT

## 2024-09-05 NOTE — TELEPHONE ENCOUNTER
We have tried contacting pt since 7/29/24 regarding her Cimzia refill. Her phone number is not a working number. We have also contacted and left messages with her daughter to let her know we couldn't get in touch with the patient. We tried 7/29, 7/31, 8/2, 8/5, 8/6, 8/7, 8/8, 8/26, 8/28 and 9/5. If patient calls back - please let her know that we cannot get a hold of her at the [phone] number.

## 2024-09-11 NOTE — TELEPHONE ENCOUNTER
We have tried contacting pt since 7/29/24 regarding her Cimzia refill. Her phone number is not a working number. We have also contacted and left messages with her daughter to let her know we couldn't get in touch with the patient. We tried 7/29, 7/31, 8/2, 8/5, 8/6, 8/7, 8/8, 8/26, 8/28, 9/5 and 9/11.  If patient calls back - please let her know that we cannot get a hold of her at the [phone] number.

## 2024-09-12 ENCOUNTER — TELEPHONE (OUTPATIENT)
Age: 64
End: 2024-09-12
Payer: MEDICAID

## 2024-09-12 RX ORDER — CERTOLIZUMAB PEGOL 200 MG/ML
400 INJECTION, SOLUTION SUBCUTANEOUS
Qty: 2 ML | Refills: 2 | Status: SHIPPED | OUTPATIENT
Start: 2024-09-12 | End: 2024-09-13 | Stop reason: SDUPTHER

## 2024-09-13 ENCOUNTER — SPECIALTY PHARMACY (OUTPATIENT)
Dept: GENERAL RADIOLOGY | Facility: HOSPITAL | Age: 64
End: 2024-09-13
Payer: MEDICAID

## 2024-09-13 ENCOUNTER — SPECIALTY PHARMACY (OUTPATIENT)
Age: 64
End: 2024-09-13
Payer: MEDICAID

## 2024-09-13 ENCOUNTER — TELEPHONE (OUTPATIENT)
Age: 64
End: 2024-09-13
Payer: MEDICAID

## 2024-09-13 RX ORDER — CERTOLIZUMAB PEGOL 200 MG/ML
400 INJECTION, SOLUTION SUBCUTANEOUS
Qty: 2 ML | Refills: 2 | Status: SHIPPED | OUTPATIENT
Start: 2024-09-13

## 2024-09-13 NOTE — PROGRESS NOTES
Specialty Pharmacy Refill Coordination Note     Jessica is a 63 y.o. female contacted today regarding refills of  Cimzia specialty medication(s).    Reviewed and verified with patient:     YES  Specialty medication(s) and dose(s) confirmed: yes    Refill Questions      Flowsheet Row Most Recent Value   Changes to allergies? No   Changes to medications? No   New conditions or infections since last clinic visit No   Unplanned office visit, urgent care, ED, or hospital admission in the last 4 weeks  No   How does patient/caregiver feel medication is working? Good   Financial problems or insurance changes  No   Since the previous refill, were any specialty medication doses or scheduled injections missed or delayed?  No   Does this patient require a clinical escalation to a pharmacist? No            Delivery Questions      Flowsheet Row Most Recent Value   Delivery method UPS   Delivery address verified with patient/caregiver? Yes   Delivery address Home   Number of medications in delivery 0   Doses left of specialty medications 0   Copay verified? Yes   Copay amount 0   Copay form of payment No copayment ($0)   Ship Date 09/16   Delivery Date 09/17   Signature Required Yes                   Follow-up: 21 day(s)     Maya Marks, Pharmacy Technician  Specialty Pharmacy Technician

## 2024-09-13 NOTE — PROGRESS NOTES
Specialty Pharmacy Patient Management Program  Per Protocol Prescription Order/Refill     Patient currently fills medications at [facility] and is enrolled in an Rheumatology Patient Management Program.     Requested Prescriptions     Pending Prescriptions Disp Refills    Certolizumab Pegol (Cimzia, 2 Syringe,) 200 MG/ML Prefilled Syringe Kit injection 2 mL 2     Sig: Inject 2 mL under the skin into the appropriate area as directed Every 28 (Twenty-Eight) Days.     Prescription orders above were sent to the pharmacy per Collaborative Care Agreement Protocol.     Addison Pierce, PharmD  Clinical Specialty Pharmacist, Endocrinology  9/13/2024  08:10 EDT

## 2024-09-23 ENCOUNTER — TELEPHONE (OUTPATIENT)
Age: 64
End: 2024-09-23
Payer: MEDICAID

## 2024-09-27 ENCOUNTER — TELEPHONE (OUTPATIENT)
Age: 64
End: 2024-09-27
Payer: MEDICAID

## 2024-10-01 ENCOUNTER — TELEPHONE (OUTPATIENT)
Age: 64
End: 2024-10-01
Payer: MEDICAID

## 2024-10-01 RX ORDER — BACLOFEN 10 MG/1
10 TABLET ORAL 3 TIMES DAILY PRN
Qty: 90 TABLET | Refills: 5 | Status: SHIPPED | OUTPATIENT
Start: 2024-10-01

## 2024-10-01 NOTE — TELEPHONE ENCOUNTER
[facility] pharmacy called to clarify instructions for baclofen as there were conflicting directions. I clarified that per [facility] it should be TID.

## 2024-10-01 NOTE — TELEPHONE ENCOUNTER
Pt is requesting a muscle relaxer for her back. She states she is having a lot of pain in her back

## 2024-10-08 ENCOUNTER — SPECIALTY PHARMACY (OUTPATIENT)
Age: 64
End: 2024-10-08
Payer: MEDICAID

## 2024-10-08 NOTE — PROGRESS NOTES
Specialty Pharmacy Refill Coordination Note     Jessica is a 63 y.o. female contacted today regarding refills of  Cimzia specialty medication(s).    Reviewed and verified with patient:     YES  Specialty medication(s) and dose(s) confirmed: yes    Refill Questions      Flowsheet Row Most Recent Value   Changes to allergies? No   Changes to medications? No   New conditions or infections since last clinic visit No   Unplanned office visit, urgent care, ED, or hospital admission in the last 4 weeks  No   How does patient/caregiver feel medication is working? Good   Financial problems or insurance changes  No   Since the previous refill, were any specialty medication doses or scheduled injections missed or delayed?  No   Does this patient require a clinical escalation to a pharmacist? No            Delivery Questions      Flowsheet Row Most Recent Value   Delivery method UPS   Delivery address verified with patient/caregiver? Yes   Delivery address Home   Number of medications in delivery 1   Medication(s) being filled and delivered Certolizumab Pegol   Doses left of specialty medications 0   Copay verified? Yes   Copay amount 0   Copay form of payment No copayment ($0)   Ship Date 10/8   Delivery Date 10/9   Signature Required No                   Follow-up: 21 day(s)     Maya Marks, Pharmacy Technician  Specialty Pharmacy Technician

## 2024-10-15 DIAGNOSIS — M05.79 SEROPOSITIVE RHEUMATOID ARTHRITIS OF MULTIPLE SITES: Chronic | ICD-10-CM

## 2024-10-15 DIAGNOSIS — M17.0 PRIMARY OSTEOARTHRITIS OF BOTH KNEES: Chronic | ICD-10-CM

## 2024-10-15 RX ORDER — MELOXICAM 15 MG/1
15 TABLET ORAL DAILY
Qty: 90 TABLET | Refills: 1 | Status: SHIPPED | OUTPATIENT
Start: 2024-10-15

## 2024-10-15 RX ORDER — TRAMADOL HYDROCHLORIDE 50 MG/1
TABLET ORAL
Qty: 180 TABLET | Refills: 2 | Status: SHIPPED | OUTPATIENT
Start: 2024-10-15

## 2024-10-22 ENCOUNTER — TELEPHONE (OUTPATIENT)
Age: 64
End: 2024-10-22
Payer: MEDICAID

## 2024-10-22 NOTE — TELEPHONE ENCOUNTER
Pt. Called checking on and update on her Tramadol appeal. I let her know that we are still waiting.

## 2024-10-31 ENCOUNTER — SPECIALTY PHARMACY (OUTPATIENT)
Age: 64
End: 2024-10-31
Payer: MEDICAID

## 2024-10-31 NOTE — PROGRESS NOTES
Specialty Pharmacy Refill Coordination Note     Jessica is a 63 y.o. female contacted today regarding refills of  Cimzia specialty medication(s).    Reviewed and verified with patient:     YES  Specialty medication(s) and dose(s) confirmed: yes    Refill Questions      Flowsheet Row Most Recent Value   Changes to allergies? No   Changes to medications? No   New conditions or infections since last clinic visit No   Unplanned office visit, urgent care, ED, or hospital admission in the last 4 weeks  No   How does patient/caregiver feel medication is working? Good   Financial problems or insurance changes  No   Since the previous refill, were any specialty medication doses or scheduled injections missed or delayed?  No   Does this patient require a clinical escalation to a pharmacist? No            Delivery Questions      Flowsheet Row Most Recent Value   Delivery method UPS   Delivery address verified with patient/caregiver? Yes   Delivery address Home   Number of medications in delivery 1   Medication(s) being filled and delivered Certolizumab Pegol (Cimzia (2 Syringe))   Copay verified? Yes   Copay amount 0   Copay form of payment No copayment ($0)   Ship Date 10/31   Delivery Date 11/1   Signature Required Yes                   Follow-up: 21 day(s)     Maya Marks, Pharmacy Technician  Specialty Pharmacy Technician

## 2024-11-05 NOTE — ASSESSMENT & PLAN NOTE
She went to two PT appointments.  We reordered PT for her and she did not received a phone call.  We will re-order.  She also was squatted down several weeks ago and her dog knocked her over.  Her low back has been sore since then.  We will have PT work on this as well.  Likely a muscle strain.  Baclofen has been helpful.

## 2024-11-05 NOTE — ASSESSMENT & PLAN NOTE
Cimzia      - Labs including CBC, CMP, ESR, and CRP every 6 months  - QTB and hepatitis panel - 12/21/2023.  [facility]ed in [facility] today.

## 2024-11-05 NOTE — ASSESSMENT & PLAN NOTE
Tramadol    UDS up to date 9/18/24  Pain contract reviewed and signed 5/2024  PDMP reviewed  No signs of abuse or diversion  Will address pain management at every visit

## 2024-11-05 NOTE — ASSESSMENT & PLAN NOTE
DX Dr Jacob RA 2007- started pred and plaquenil. Rheumatoid Factor >100.   1/2007 MTX  6/2008 Humira and dc plaquenil  2010-Humira was from the company and she stopped methotrexate because she couldn't afford it.  Started low-dose prednisone for a short time.  Restarted mtx.  8/2010 dc Humira- rash.    Arava was stopped in 2017 due to elevated LFTS    CURRENT TREATMENT : CIMZIA subQ monthly (about 2017)    - continue Cimzia monthly at home injections.  - No synovitis today.    - Pain score driven by back pain from recently being knocked down by dog.

## 2024-11-05 NOTE — ASSESSMENT & PLAN NOTE
?secondary sjogrens/RA could cause some neuropathy.    Rarely, cimzia can contribute to a peripheral neuropathy  She does not have known diabetes.  She does have some DDD in her lumbar spine    - She previously was going to have an EMG with her PCP. She reports she did not ever have this done. She declined order for EMG today  - she does have a positive SSA with sicca symptoms  - already on gabapentin  -B12 was 735 and folate greater than 20 on 12/21/2023 at last visit.

## 2024-11-05 NOTE — ASSESSMENT & PLAN NOTE
Left worse right  - She has moved to a one story house.  - we referred her to PT but she did not go  - continue tramadol, meloxicam, and Tylenol for pain.  - Tramadol has been denied by her insurance.  She has been paying cash price.  - Also using topical Voltaren gel

## 2024-11-06 ENCOUNTER — OFFICE VISIT (OUTPATIENT)
Age: 64
End: 2024-11-06
Payer: MEDICAID

## 2024-11-06 ENCOUNTER — TELEPHONE (OUTPATIENT)
Age: 64
End: 2024-11-06

## 2024-11-06 VITALS
BODY MASS INDEX: 18.49 KG/M2 | DIASTOLIC BLOOD PRESSURE: 78 MMHG | WEIGHT: 122 LBS | SYSTOLIC BLOOD PRESSURE: 102 MMHG | TEMPERATURE: 97.7 F | HEIGHT: 68 IN | HEART RATE: 74 BPM

## 2024-11-06 DIAGNOSIS — M05.79 SEROPOSITIVE RHEUMATOID ARTHRITIS OF MULTIPLE SITES: Primary | Chronic | ICD-10-CM

## 2024-11-06 DIAGNOSIS — M17.0 PRIMARY OSTEOARTHRITIS OF BOTH KNEES: Chronic | ICD-10-CM

## 2024-11-06 DIAGNOSIS — G62.9 NEUROPATHY: Chronic | ICD-10-CM

## 2024-11-06 DIAGNOSIS — R52 PAIN MANAGEMENT: Chronic | ICD-10-CM

## 2024-11-06 DIAGNOSIS — R53.82 CHRONIC FATIGUE: ICD-10-CM

## 2024-11-06 DIAGNOSIS — Z79.899 ENCOUNTER FOR LONG-TERM (CURRENT) USE OF HIGH-RISK MEDICATION: Chronic | ICD-10-CM

## 2024-11-06 DIAGNOSIS — M41.20 OTHER IDIOPATHIC SCOLIOSIS, UNSPECIFIED SPINAL REGION: ICD-10-CM

## 2024-11-06 RX ORDER — BACLOFEN 10 MG/1
10 TABLET ORAL 3 TIMES DAILY PRN
Qty: 90 TABLET | Refills: 5 | Status: SHIPPED | OUTPATIENT
Start: 2024-11-06 | End: 2024-11-06

## 2024-11-06 RX ORDER — TRAMADOL HYDROCHLORIDE 50 MG/1
TABLET ORAL
Qty: 180 TABLET | Refills: 2 | Status: SHIPPED | OUTPATIENT
Start: 2024-11-06 | End: 2024-11-06

## 2024-11-06 RX ORDER — MELOXICAM 15 MG/1
15 TABLET ORAL DAILY
Qty: 90 TABLET | Refills: 1 | Status: SHIPPED | OUTPATIENT
Start: 2024-11-06 | End: 2024-11-06

## 2024-11-06 RX ORDER — LISINOPRIL 10 MG/1
10 TABLET ORAL EVERY 12 HOURS
COMMUNITY
Start: 2024-08-20

## 2024-11-06 RX ORDER — TRAMADOL HYDROCHLORIDE 50 MG/1
TABLET ORAL
Qty: 180 TABLET | Refills: 2 | Status: SHIPPED | OUTPATIENT
Start: 2024-11-06

## 2024-11-06 RX ORDER — MELOXICAM 15 MG/1
15 TABLET ORAL DAILY
Qty: 90 TABLET | Refills: 1 | Status: SHIPPED | OUTPATIENT
Start: 2024-11-06

## 2024-11-06 RX ORDER — BACLOFEN 10 MG/1
10 TABLET ORAL 3 TIMES DAILY PRN
Qty: 90 TABLET | Refills: 5 | Status: SHIPPED | OUTPATIENT
Start: 2024-11-06

## 2024-11-06 RX ORDER — BACLOFEN 10 MG/1
10 TABLET ORAL 3 TIMES DAILY PRN
Qty: 90 TABLET | Refills: 5 | Status: SHIPPED | OUTPATIENT
Start: 2024-11-06 | End: 2024-11-06 | Stop reason: SDUPTHER

## 2024-11-06 NOTE — PROGRESS NOTES
Office Visit       Date: 11/06/2024   Patient Name: Jessica De Jesus  MRN: [id number]  YOB: 1960    Referring Physician: Zeyad Small MD     Chief Complaint:   Chief Complaint   Patient presents with   • Rheumatoid Arthritis     Follow up       History of Present Illness: Jessica De Jesus is a 63 y.o. female who is here today for follow-up of rheumatoid arthritis.  Today she rates her pain 9 out of 10 and has 4 hours of morning stiffness.  She reports feeling worse.  We prescribed her Cimzia,  Voltaren gel, meloxicam 15 mg daily and tramadol    3 times daily as needed joint pain mg as well.      Subjective   Review of Systems:  Positive for eye itching, dry eyes, memory problems, neck pain and back pain.  All other review of symptoms are negative.    Past Medical History:   Past Medical History:   Diagnosis Date   • Back pain    • Chronic fatigue    • H/O high risk medication treatment    • Long-term use of immunosuppressant medication    • Pain management    • Rheumatoid arthritis    • Thoracic back pain        Past Surgical History: History reviewed. No pertinent surgical history.    Family History: History reviewed. No pertinent family history.    Social History:   Social History     Socioeconomic History   • Marital status: Single   Tobacco Use   • Smoking status: Never   • Smokeless tobacco: Never   Vaping Use   • Vaping status: Never Used   Substance and Sexual Activity   • Alcohol use: Never   • Drug use: Never   • Sexual activity: Defer       Medications:   Current Outpatient Medications:   •  albuterol sulfate  (90 Base) MCG/ACT inhaler, Inhale 2 puffs Every 4 (Four) to 6 (Six) Hours. As needed, Disp: , Rfl:   •  atorvastatin (LIPITOR) 10 MG tablet, Take 1 tablet by mouth Daily., Disp: , Rfl:   •  baclofen (LIORESAL) 10 MG tablet, Take 1 tablet by mouth 3 (Three) Times a Day As Needed for Muscle Spasms. Take 1 tablet by oral route 4 times every day PRN, Disp: 90  tablet, Rfl: 5  •  bisacodyl (bisacodyl) 5 MG EC tablet, Take 1 tablet by mouth Daily., Disp: 4 tablet, Rfl: 0  •  Certolizumab Pegol (Cimzia, 2 Syringe,) 200 MG/ML Prefilled Syringe Kit injection, Inject 2 mL under the skin into the appropriate area as directed Every 28 (Twenty-Eight) Days., Disp: 2 mL, Rfl: 2  •  cetirizine (ZyrTEC Allergy) 10 MG tablet, Take 1 tablet by mouth Daily., Disp: , Rfl:   •  Diclofenac Sodium (VOLTAREN) 1 % gel gel, Apply 2 g topically to the appropriate area as directed 2 (Two) Times a Day., Disp: 300 g, Rfl: 2  •  fluticasone (Flonase Allergy Relief) 50 MCG/ACT nasal spray, Administer 1-2 sprays into the nostril(s) as directed by provider Daily., Disp: , Rfl:   •  gabapentin (NEURONTIN) 600 MG tablet, Take 1 tablet by mouth 3 (Three) Times a Day., Disp: , Rfl:   •  ipratropium (ATROVENT) 0.06 % nasal spray, Administer 2 sprays into the nostril(s) as directed by provider 3 (Three) Times a Day. As needed, Disp: , Rfl:   •  lisinopril (PRINIVIL,ZESTRIL) 10 MG tablet, Take 1 tablet by mouth Every 12 (Twelve) Hours., Disp: , Rfl:   •  meloxicam (MOBIC) 15 MG tablet, Take 1 tablet by mouth Daily., Disp: 90 tablet, Rfl: 1  •  montelukast (SINGULAIR) 10 MG tablet, Take 1 tablet by mouth Every Night., Disp: , Rfl:   •  multivitamin (MULTI VITAMIN PO), Take  by mouth Daily., Disp: , Rfl:   •  oxybutynin XL (Ditropan XL) 10 MG 24 hr tablet, Take 1 tablet by mouth Daily., Disp: , Rfl:   •  pantoprazole (PROTONIX) 40 MG EC tablet, Take 1 tablet by mouth Daily., Disp: , Rfl:   •  sertraline (Zoloft) 100 MG tablet, Take 1 tablet by mouth Daily., Disp: , Rfl:   •  traMADol (ULTRAM) 50 MG tablet, Take 1-2 tablets po TID prn pain, Disp: 180 tablet, Rfl: 2    Allergies: No Known Allergies    I have reviewed and updated the patient's chief complaint, history of present illness, review of systems, past medical history, surgical history, family history, social history, medications and allergy list as  "appropriate.     Objective    Vital Signs:   Vitals:    11/06/24 1109   BP: 102/78   BP Location: Right arm   Pulse: 74   Temp: 97.7 °F (36.5 °C)   Weight: 55.3 kg (122 lb)   Height: 172.7 cm (68\")   PainSc:   9   PainLoc: Back     Body mass index is 18.55 kg/m².   BMI is within normal parameters. No other follow-up for BMI required.       Physical Exam:  Physical Exam  Vitals reviewed.   Constitutional:       Appearance: Normal appearance.   HENT:      Head: Normocephalic and atraumatic.      Mouth/Throat:      Mouth: Mucous membranes are moist.   Eyes:      Conjunctiva/sclera: Conjunctivae normal.   Cardiovascular:      Rate and Rhythm: Normal rate and regular rhythm.      Pulses: Normal pulses.      Heart sounds: Normal heart sounds.   Pulmonary:      Effort: Pulmonary effort is normal.      Breath sounds: Normal breath sounds.   Musculoskeletal:         General: Normal range of motion.      Cervical back: Normal range of motion and neck supple.      Comments: Tender paraspinal muscles at lumbar spine with 75% flexion and extension  Scoliosis to the right  Heberden's right hand  Crepitus bilateral knees  No synovitis   Skin:     General: Skin is warm and dry.   Neurological:      General: No focal deficit present.      Mental Status: She is alert and oriented to person, place, and time. Mental status is at baseline.   Psychiatric:         Mood and Affect: Mood normal.         Behavior: Behavior normal.         Thought Content: Thought content normal.         Judgment: Judgment normal.          Results Review:   Imaging Results (Last 24 Hours)       ** No results found for the last 24 hours. **            Procedures    Assessment / Plan    Assessment/Plan:   Diagnoses and all orders for this visit:    1. Seropositive rheumatoid arthritis of multiple sites (Primary)  Assessment & Plan:  DX Dr Nidia DYSON 2007- started pred and plaquenil. Rheumatoid Factor >100.   1/2007 MTX  6/2008 Humira and dc plaquenil  2010-Humira " was from the company and she stopped methotrexate because she couldn't afford it.  Started low-dose prednisone for a short time.  Restarted mtx.  8/2010 dc Humira- rash.    Arava was stopped in 2017 due to elevated LFTS    CURRENT TREATMENT : CIMZIA subQ monthly (about 2017)    - continue Cimzia monthly at home injections.  - No synovitis today.    - Pain score driven by back pain from recently being knocked down by dog.    Orders:  -     CBC With Manual Differential; Future  -     Comprehensive Metabolic Panel; Future  -     C-reactive Protein; Future  -     Sedimentation Rate; Future  -     Discontinue: traMADol (ULTRAM) 50 MG tablet; Take 1-2 tablets po TID prn pain  Dispense: 180 tablet; Refill: 2  -     Discontinue: meloxicam (MOBIC) 15 MG tablet; Take 1 tablet by mouth Daily.  Dispense: 90 tablet; Refill: 1  -     meloxicam (MOBIC) 15 MG tablet; Take 1 tablet by mouth Daily.  Dispense: 90 tablet; Refill: 1  -     traMADol (ULTRAM) 50 MG tablet; Take 1-2 tablets po TID prn pain  Dispense: 180 tablet; Refill: 2    2. Primary osteoarthritis of both knees  Assessment & Plan:  Left worse right  - She has moved to a one story house.  - we referred her to PT but she did not go  - continue tramadol, meloxicam, and Tylenol for pain.  - Tramadol has been denied by her insurance.  She has been paying cash price.  - Also using topical Voltaren gel    Orders:  -     Discontinue: traMADol (ULTRAM) 50 MG tablet; Take 1-2 tablets po TID prn pain  Dispense: 180 tablet; Refill: 2  -     Discontinue: meloxicam (MOBIC) 15 MG tablet; Take 1 tablet by mouth Daily.  Dispense: 90 tablet; Refill: 1  -     meloxicam (MOBIC) 15 MG tablet; Take 1 tablet by mouth Daily.  Dispense: 90 tablet; Refill: 1  -     traMADol (ULTRAM) 50 MG tablet; Take 1-2 tablets po TID prn pain  Dispense: 180 tablet; Refill: 2    3. Other idiopathic scoliosis, unspecified spinal region  Assessment & Plan:  She went to two PT appointments.  We reordered PT for  her and she did not received a phone call.  We will re-order.  She also was squatted down several weeks ago and her dog knocked her over.  Her low back has been sore since then.  We will have PT work on this as well.  Likely a muscle strain.  Baclofen has been helpful.    Orders:  -     Ambulatory Referral to Physical Therapy for Evaluation & Treatment    4. Pain management  Assessment & Plan:  Tramadol    UDS up to date 9/18/24  Pain contract reviewed and signed 5/2024  PDMP reviewed  No signs of abuse or diversion  Will address pain management at every visit      5. Neuropathy  Assessment & Plan:  ?secondary sjogrens/RA could cause some neuropathy.    Rarely, cimzia can contribute to a peripheral neuropathy  She does not have known diabetes.  She does have some DDD in her lumbar spine    - She previously was going to have an EMG with her PCP. She reports she did not ever have this done. She declined order for EMG today  - she does have a positive SSA with sicca symptoms  - already on gabapentin  -B12 was 735 and folate greater than 20 on 12/21/2023 at last visit.        6. Encounter for long-term (current) use of high-risk medication  Assessment & Plan:  Cimzia      - Labs including CBC, CMP, ESR, and CRP every 6 months  - QTB and hepatitis panel - 12/21/2023.  Reviewed in [facility] today.    Orders:  -     CBC With Manual Differential; Future  -     Comprehensive Metabolic Panel; Future  -     C-reactive Protein; Future  -     Sedimentation Rate; Future    7. Chronic fatigue  Assessment & Plan:  Fatigue continues to be a problem      Other orders  -     Discontinue: Diclofenac Sodium (VOLTAREN) 1 % gel gel; Apply 2 g topically to the appropriate area as directed 2 (Two) Times a Day.  Dispense: 300 g; Refill: 2  -     Discontinue: baclofen (LIORESAL) 10 MG tablet; Take 1 tablet by mouth 3 (Three) Times a Day As Needed for Muscle Spasms. Take 1 tablet by oral route 4 times every day PRN  Dispense: 90 tablet; Refill:  5  -     baclofen (LIORESAL) 10 MG tablet; Take 1 tablet by mouth 3 (Three) Times a Day As Needed for Muscle Spasms. Take 1 tablet by oral route 4 times every day PRN  Dispense: 90 tablet; Refill: 5  -     Diclofenac Sodium (VOLTAREN) 1 % gel gel; Apply 2 g topically to the appropriate area as directed 2 (Two) Times a Day.  Dispense: 300 g; Refill: 2        Follow Up:   Return in about 4 months (around 3/6/2025) for Dr. Lovelace.        LAURA Lagunas  [facility]

## 2024-11-06 NOTE — TELEPHONE ENCOUNTER
Pharmacy needing clarification for RX baclofen.. per chart note sent in correction to [facility]

## 2024-11-07 RX ORDER — CERTOLIZUMAB PEGOL 200 MG/ML
400 INJECTION, SOLUTION SUBCUTANEOUS
Qty: 2 ML | Refills: 5 | Status: SHIPPED | OUTPATIENT
Start: 2024-11-07

## 2024-11-07 NOTE — TELEPHONE ENCOUNTER
Specialty Pharmacy Patient Management Program  Per Protocol Prescription Order/Refill     Patient currently fills medications at [facility] and is enrolled in an Rheumatology Patient Management Program.     Requested Prescriptions     Signed Prescriptions Disp Refills    Certolizumab Pegol (Cimzia, 2 Syringe,) 200 MG/ML Prefilled Syringe Kit injection 2 mL 5     Sig: Inject 2 mL under the skin into the appropriate area as directed Every 28 (Twenty-Eight) Days.     Authorizing Provider: EARLE CHIU     Ordering User: ALEXI HAMMOND     Prescription orders above were sent to the pharmacy per Collaborative Care Agreement Protocol.

## 2024-11-22 ENCOUNTER — SPECIALTY PHARMACY (OUTPATIENT)
Age: 64
End: 2024-11-22
Payer: MEDICAID

## 2024-11-22 NOTE — PROGRESS NOTES
Specialty Pharmacy Refill Coordination Note     Jessica is a 63 y.o. female contacted today regarding refills of  Cimzia specialty medication(s).    Reviewed and verified with patient:       Specialty medication(s) and dose(s) confirmed: yes    Refill Questions      Flowsheet Row Most Recent Value   Changes to allergies? No   Changes to medications? No   New conditions or infections since last clinic visit No   Unplanned office visit, urgent care, ED, or hospital admission in the last 4 weeks  No   How does patient/caregiver feel medication is working? Good   Financial problems or insurance changes  No   Since the previous refill, were any specialty medication doses or scheduled injections missed or delayed?  No   Does this patient require a clinical escalation to a pharmacist? No            Delivery Questions      Flowsheet Row Most Recent Value   Delivery method UPS   Delivery address verified with patient/caregiver? Yes   Delivery address Home   Number of medications in delivery 1   Medication(s) being filled and delivered Certolizumab Pegol (Cimzia (2 Syringe))   Copay verified? Yes   Copay amount 0   Copay form of payment No copayment ($0)   Ship Date 11/25   Delivery Date 11/26   Signature Required No                   Follow-up: 21 day(s)     Maya Marks, Pharmacy Technician  Specialty Pharmacy Technician

## 2024-11-26 ENCOUNTER — TELEPHONE (OUTPATIENT)
Age: 64
End: 2024-11-26
Payer: MEDICAID

## 2024-11-26 NOTE — TELEPHONE ENCOUNTER
I had submitted a second level appeal for Tramadol to be covered back on 9/26/24. We received a fax stating the appeal request had been received but we never got a determination. I called [facility] to check on this and I was instructed to send an email to [email or web address] to check this status. I have sent an email and am awaiting a response.

## 2024-11-26 NOTE — TELEPHONE ENCOUNTER
Caller: Jessica De Jesus    Relationship to patient: Self      Best call back number: [phone]     Provider: APPLE    Medication PA needed: TRAMADOL    Reason for call/Prior Auth: PT ALSO WANTS TO KNOW IF THERE IS AN ALTERNATIVE THE INSURANCE WILL COVER.

## 2024-11-27 NOTE — TELEPHONE ENCOUNTER
See previous messages. I was able to track down the appeal determination and Tramadol is still denied by insurance after two appeals due to being deemed not medically necessary. I'll scan the denial letter into her chart. Patient had told me she'd been paying out of pocket for Tramadol. I had told her that she could check GoodRx for other pharmacies where she may be able to get it cheaper but she wanted to continue using her same pharmacy and didn't seem to be interested in trying this. She is now wanting to know if there is an alternative that can be prescribed that would be covered by insurance. Please advise.

## 2024-12-18 ENCOUNTER — SPECIALTY PHARMACY (OUTPATIENT)
Age: 64
End: 2024-12-18
Payer: MEDICAID

## 2024-12-18 NOTE — PROGRESS NOTES
Specialty Pharmacy Patient Management Program  Rheumatology Reassessment     Jessica De Jesus was referred by an Rheumatology provider to the Rheumatology Patient Management program offered by [facility] for Rheumatoid Arthritis. A follow-up outreach was conducted, including assessment of continued therapy appropriateness, medication adherence, and side effect incidence and management for Cimzia.    Changes to Insurance Coverage or Financial Support  No changes, [place] Medicaid    Relevant Past Medical History and Comorbidities  Relevant medical history and concomitant health conditions were discussed with the patient. The patient's chart has been reviewed for relevant past medical history and comorbid health conditions and updated as necessary.   Past Medical History:   Diagnosis Date    Back pain     Chronic fatigue     H/O high risk medication treatment     Long-term use of immunosuppressant medication     Pain management     Rheumatoid arthritis     Thoracic back pain      Social History     Socioeconomic History    Marital status: Single   Tobacco Use    Smoking status: Never    Smokeless tobacco: Never   Vaping Use    Vaping status: Never Used   Substance and Sexual Activity    Alcohol use: Never    Drug use: Never    Sexual activity: Defer     Problem list reviewed by Susanne Matias PharmD on 12/18/2024 at 10:18 AM    Hospitalizations and Urgent Care Since Last Assessment  ED Visits, Admissions, or Hospitalizations: none  Urgent Office Visits: none    Allergies  Known allergies and reactions were discussed with the patient. The patient's chart has been reviewed for allergy information and updated as necessary.   No Known Allergies  Allergies reviewed by Susanne Matias PharmD on 12/18/2024 at 10:18 AM    Relevant Laboratory Values  Relevant laboratory values were discussed with the patient. The following specialty medication dose adjustment(s) are recommended:     No results found for:  "\"HGBA1C\"  No results found for: \"GLUCOSE\", \"CALCIUM\", \"NA\", \"K\", \"CO2\", \"CL\", \"BUN\", \"CREATININE\", \"EGFRIFAFRI\", \"EGFRIFNONA\", \"BCR\", \"ANIONGAP\"  No results found for: \"CHOL\", \"CHLPL\", \"TRIG\", \"HDL\", \"LDL\", \"LDLDIRECT\"    Current Medication List  This medication list has been reviewed with the patient and evaluated for any interactions or necessary modifications/recommendations, and updated to include all prescription medications, OTC medications, and supplements the patient is currently taking.  This list reflects what is contained in the patient's profile, which has also been marked as reviewed to communicate to other providers it is the most up to date version of the patient's current medication therapy.     Current Outpatient Medications:     albuterol sulfate  (90 Base) MCG/ACT inhaler, Inhale 2 puffs Every 4 (Four) to 6 (Six) Hours. As needed, Disp: , Rfl:     atorvastatin (LIPITOR) 10 MG tablet, Take 1 tablet by mouth Daily., Disp: , Rfl:     baclofen (LIORESAL) 10 MG tablet, Take 1 tablet by mouth 3 (Three) Times a Day As Needed for Muscle Spasms., Disp: 90 tablet, Rfl: 5    bisacodyl (bisacodyl) 5 MG EC tablet, Take 1 tablet by mouth Daily., Disp: 4 tablet, Rfl: 0    Certolizumab Pegol (Cimzia, 2 Syringe,) 200 MG/ML Prefilled Syringe Kit injection, Inject 2 mL under the skin into the appropriate area as directed Every 28 (Twenty-Eight) Days., Disp: 2 mL, Rfl: 5    cetirizine (ZyrTEC Allergy) 10 MG tablet, Take 1 tablet by mouth Daily., Disp: , Rfl:     Diclofenac Sodium (VOLTAREN) 1 % gel gel, Apply 2 g topically to the appropriate area as directed 2 (Two) Times a Day., Disp: 300 g, Rfl: 2    fluticasone (Flonase Allergy Relief) 50 MCG/ACT nasal spray, Administer 1-2 sprays into the nostril(s) as directed by provider Daily., Disp: , Rfl:     gabapentin (NEURONTIN) 600 MG tablet, Take 1 tablet by mouth 3 (Three) Times a Day., Disp: , Rfl:     ipratropium (ATROVENT) 0.06 % nasal spray, Administer 2 " sprays into the nostril(s) as directed by provider 3 (Three) Times a Day. As needed, Disp: , Rfl:     lisinopril (PRINIVIL,ZESTRIL) 10 MG tablet, Take 1 tablet by mouth Every 12 (Twelve) Hours., Disp: , Rfl:     meloxicam (MOBIC) 15 MG tablet, Take 1 tablet by mouth Daily., Disp: 90 tablet, Rfl: 1    montelukast (SINGULAIR) 10 MG tablet, Take 1 tablet by mouth Every Night., Disp: , Rfl:     multivitamin (MULTI VITAMIN PO), Take  by mouth Daily., Disp: , Rfl:     oxybutynin XL (Ditropan XL) 10 MG 24 hr tablet, Take 1 tablet by mouth Daily., Disp: , Rfl:     pantoprazole (PROTONIX) 40 MG EC tablet, Take 1 tablet by mouth Daily., Disp: , Rfl:     sertraline (Zoloft) 100 MG tablet, Take 1 tablet by mouth Daily., Disp: , Rfl:     traMADol (ULTRAM) 50 MG tablet, Take 1-2 tablets po TID prn pain, Disp: 180 tablet, Rfl: 2    Medicines reviewed by Susanne Matias, PharmD on 12/18/2024 at 10:18 AM    Drug Interactions  No medication interactions    Adverse Drug Reactions  Medication tolerability: Tolerating with no to minimal ADRs (sometimes the patients just feels yucky the day after her injection.)  Medication plan: Continue therapy with normal follow-up  Plan for ADR Management: no ADR's    Adherence, Self-Administration, and Current Therapy Problems  Adherence related to the patient's specialty therapy was discussed with the patient. The Adherence segment of this outreach has been reviewed and updated.     Adherence Questions  Linked Medication(s) Assessed: Certolizumab Pegol (Cimzia (2 Syringe))  On average, how many doses/injections does the patient miss per month?: 0  What are the identified reasons for non-adherence or missed doses? : no problems identified  What is the estimated medication adherence level?: %  Based on the patient/caregiver response and refill history, does this patient require an MTP to track adherence improvements?: no    Additional Barriers to Patient Self-Administration: none  Methods for  Supporting Patient Self-Administration: none    Open Medication Therapy Problems  No medication therapy recommendations to display    Goals of Therapy  Goals related to the patient's specialty therapy were discussed with the patient. The Patient Goals segment of this outreach has been reviewed and updated.   Goals Addressed Today        Specialty Pharmacy General Goal      Reduce number of flares and pain score.     12.18.24: Patient says that she had experienced reduced joint pain and stiffness and rarely has flares.                 Quality of Life Assessment   Quality of Life related to the patient's enrollment in the patient management program and services provided was discussed with the patient. The QOL segment of this outreach has been reviewed and updated.  Quality of Life Improvement Scale: 8-Moderately better    Reassessment Plan & Follow-Up  1. Medication Therapy Changes: Continue Cimzia 400mg injection every 28 days  2. Related Plans, Therapy Recommendations, or Issues to Be Addressed: no questions or concerns at this time  3. Pharmacist to perform regular assessments no more than (6) months from the previous assessment.  4. Care Coordinator to set up future refill outreaches, coordinate prescription delivery, and escalate clinical questions to pharmacist.    Attestation  Therapeutic appropriateness: Appropriate   I attest the patient was actively involved in and has agreed to the above plan of care.  If the prescribed therapy is at any point deemed not appropriate based on the current or future assessments, a consultation will be initiated with the patient's specialty care provider to determine the best course of action. The revised plan of therapy will be documented along with any required assessments and/or additional patient education provided.     Susanne Matias, PharmD, BCPS  Clinical Specialty Pharmacist, Rheumatology   12/18/2024  10:21 EST

## 2024-12-18 NOTE — PROGRESS NOTES
Specialty Pharmacy Patient Management Program  Rheumatology Refill Outreach      Jessica is a 64 y.o. female contacted today regarding refills of her medication(s) Cimzia.    Specialty medication(s) and dose(s) confirmed: yes    Refill Questions      Flowsheet Row Most Recent Value   Changes to allergies? No   Changes to medications? No   New conditions or infections since last clinic visit No   Unplanned office visit, urgent care, ED, or hospital admission in the last 4 weeks  No   How does patient/caregiver feel medication is working? Good   Financial problems or insurance changes  No   Since the previous refill, were any specialty medication doses or scheduled injections missed or delayed?  No   Does this patient require a clinical escalation to a pharmacist? No          Delivery Questions      Flowsheet Row Most Recent Value   Delivery method UPS   Delivery address verified with patient/caregiver? Yes   Delivery address Home   Number of medications in delivery 1   Medication(s) being filled and delivered Certolizumab Pegol (Cimzia (2 Syringe))   Doses left of specialty medications 1   Copay verified? Yes   Copay amount $0   Copay form of payment No copayment ($0)   Ship Date 12.18   Delivery Date 12.19   Signature Required Yes            Follow-Up: 21 days    Susanne Matias, PharmD, BCPS  Clinical Specialty Pharmacist, Rheumatology   12/18/2024  10:20 EST

## 2025-01-22 ENCOUNTER — SPECIALTY PHARMACY (OUTPATIENT)
Age: 65
End: 2025-01-22
Payer: MEDICAID

## 2025-01-22 DIAGNOSIS — M17.0 PRIMARY OSTEOARTHRITIS OF BOTH KNEES: Chronic | ICD-10-CM

## 2025-01-22 DIAGNOSIS — M05.79 SEROPOSITIVE RHEUMATOID ARTHRITIS OF MULTIPLE SITES: Chronic | ICD-10-CM

## 2025-01-22 NOTE — TELEPHONE ENCOUNTER
Pt called asking for a refill for Tramadol to be sent to [facility] in [facility].

## 2025-01-22 NOTE — PROGRESS NOTES
Specialty Pharmacy Patient Management Program  Rheumatology Refill Outreach      Jessica is a 64 y.o. female contacted today regarding refills of her medication(s).    Reviewed and verified with patient: yes    Specialty medication(s) and dose(s) confirmed: yes    Refill Questions      Flowsheet Row Most Recent Value   Changes to allergies? No   Changes to medications? No   New conditions or infections since last clinic visit No   Unplanned office visit, urgent care, ED, or hospital admission in the last 4 weeks  No   How does patient/caregiver feel medication is working? Good   Financial problems or insurance changes  No   Since the previous refill, were any specialty medication doses or scheduled injections missed or delayed?  No   Does this patient require a clinical escalation to a pharmacist? No          Delivery Questions      Flowsheet Row Most Recent Value   Delivery method UPS   Delivery address verified with patient/caregiver? Yes   Delivery address Home   Number of medications in delivery 1   Medication(s) being filled and delivered Certolizumab Pegol (Cimzia (2 Syringe))   Doses left of specialty medications 0   Copay verified? Yes   Copay amount $0   Copay form of payment No copayment ($0)   Ship Date 1/22/25   Delivery Date Selection 01/23/25   Signature Required Yes            Follow-Up: 21 days    Jose Stanton, PharmD  Pharmacist, Rheumatology

## 2025-01-23 RX ORDER — TRAMADOL HYDROCHLORIDE 50 MG/1
TABLET ORAL
Qty: 180 TABLET | Refills: 0 | Status: SHIPPED | OUTPATIENT
Start: 2025-01-23

## 2025-02-17 ENCOUNTER — SPECIALTY PHARMACY (OUTPATIENT)
Age: 65
End: 2025-02-17
Payer: MEDICAID

## 2025-02-17 DIAGNOSIS — M05.79 SEROPOSITIVE RHEUMATOID ARTHRITIS OF MULTIPLE SITES: Chronic | ICD-10-CM

## 2025-02-17 DIAGNOSIS — M17.0 PRIMARY OSTEOARTHRITIS OF BOTH KNEES: Chronic | ICD-10-CM

## 2025-02-17 RX ORDER — TRAMADOL HYDROCHLORIDE 50 MG/1
TABLET ORAL
Qty: 180 TABLET | Refills: 0 | Status: SHIPPED | OUTPATIENT
Start: 2025-02-17

## 2025-02-17 NOTE — PROGRESS NOTES
Specialty Pharmacy Patient Management Program  Refill Outreach     Jessica was contacted today regarding refills of their medication(s). Cimzia    Refill Questions      Flowsheet Row Most Recent Value   Changes to allergies? No   Changes to medications? No   New conditions or infections since last clinic visit No   Unplanned office visit, urgent care, ED, or hospital admission in the last 4 weeks  No   How does patient/caregiver feel medication is working? Good   Financial problems or insurance changes  No   Since the previous refill, were any specialty medication doses or scheduled injections missed or delayed?  No   Does this patient require a clinical escalation to a pharmacist? No            Delivery Questions      Flowsheet Row Most Recent Value   Delivery method UPS   Delivery address verified with patient/caregiver? Yes   Delivery address Home   Number of medications in delivery 1   Medication(s) being filled and delivered Certolizumab Pegol (Cimzia (2 Syringe))   Doses left of specialty medications 0 next dose on 2/24   Copay verified? No   Copay amount $0   Copay form of payment No copayment ($0)   Ship Date 2/20/25   Delivery Date Selection 02/21/25   Signature Required Yes                 Follow-up: 21 day(s)     Ethan John CPhT-Adv  2/17/2025  08:56 EST

## 2025-02-17 NOTE — TELEPHONE ENCOUNTER
Caller: Jessica De Jesus    Relationship: Self    Best call back number: [phone]    Requested Prescriptions:   traMADol (ULTRAM) 50 MG tablet        Pharmacy where request should be sent:  [facility] [place] [facility] [address] [facility] [facility] [phone] [facility] [phone]       Next office visit with prescribing clinician: 4/16/2025     Additional details provided by patient: PATIENT STATES SHE WILL BE OUT OF traMADol (ULTRAM) 50 MG tablet 02/23    Does the patient have less than a 3 day supply:  [] Yes  [x] No    Would you like a call back once the refill request has been completed: [x] Yes [] No    If the office needs to give you a call back, can they leave a voicemail: [x] Yes [] No    Yordy Beaulieu Rep   02/17/25 10:26 EST

## 2025-02-24 ENCOUNTER — TELEPHONE (OUTPATIENT)
Age: 65
End: 2025-02-24
Payer: MEDICAID

## 2025-02-24 NOTE — TELEPHONE ENCOUNTER
Pt. Called and said her Tramadol needs her PA.  Her insurance gave her the number [phone].  They told her we need to call that number and do a PA.

## 2025-02-25 ENCOUNTER — TELEPHONE (OUTPATIENT)
Age: 65
End: 2025-02-25
Payer: MEDICAID

## 2025-02-25 DIAGNOSIS — Z79.899 ENCOUNTER FOR LONG-TERM (CURRENT) USE OF HIGH-RISK MEDICATION: Primary | ICD-10-CM

## 2025-02-26 NOTE — TELEPHONE ENCOUNTER
Called and notified the patient she will need to complete an UDS lab for her insurance to approve Tramadol. Patient understood and requested the lab order to be faxed to [facility] at [phone].  
PA for patient's Tramadol was denied because insurance wanted her to have a UDS within the last 30 days. Her last UDS was 9/18/24. Do you want to order an updated UDS? I can then try to appeal or submit a new PA.   
Patient Reported Weight (Optional - Include Units): 108
Detail Level: Zone
Ipledge Number (Optional): [id number]

## 2025-03-14 ENCOUNTER — TELEPHONE (OUTPATIENT)
Age: 65
End: 2025-03-14
Payer: MEDICAID

## 2025-03-14 ENCOUNTER — SPECIALTY PHARMACY (OUTPATIENT)
Age: 65
End: 2025-03-14
Payer: MEDICAID

## 2025-03-14 NOTE — TELEPHONE ENCOUNTER
PA request has been approved and pharmacy notified (Filling pharmacy will be notified by phone, fax, or submitted prescription)    Authorized Medication: Cimzia  Name of Insurance Approving PA: [employer]  Pharmacy PA Number: [id number]  PA Effective Dates: 3.14.25-3.14.26  Dispensing Pharmacy: [facility]

## 2025-03-14 NOTE — TELEPHONE ENCOUNTER
Prior authorization initiated by [facility].  Update will be provided when a determination has been received.     Medication: Cimzia  PA Submission Method: CMM  Case Number/CMM Key: [id number]

## 2025-03-24 ENCOUNTER — SPECIALTY PHARMACY (OUTPATIENT)
Age: 65
End: 2025-03-24
Payer: MEDICAID

## 2025-03-24 NOTE — PROGRESS NOTES
Specialty Pharmacy Patient Management Program  Refill Outreach     Jessica was contacted today regarding refills of their medication(s). Cimzia    Refill Questions      Flowsheet Row Most Recent Value   Changes to allergies? No   Changes to medications? No   New conditions or infections since last clinic visit No   Unplanned office visit, urgent care, ED, or hospital admission in the last 4 weeks  No   How does patient/caregiver feel medication is working? Good   Financial problems or insurance changes  No   Since the previous refill, were any specialty medication doses or scheduled injections missed or delayed?  No   Does this patient require a clinical escalation to a pharmacist? No            Delivery Questions      Flowsheet Row Most Recent Value   Delivery method UPS   Delivery address verified with patient/caregiver? Yes   Delivery address Home   Medication(s) being filled and delivered Certolizumab Pegol (Cimzia (2 Syringe))   Copay verified? Yes   Copay amount 0   Copay form of payment No copayment ($0)   Delivery Date Selection 03/27/25   Signature Required Yes                 Follow-up: 21 day(s)     Madeleine Jimenez, Pharmacy Technician  3/24/2025  11:12 EDT

## 2025-04-10 DIAGNOSIS — M17.0 PRIMARY OSTEOARTHRITIS OF BOTH KNEES: Chronic | ICD-10-CM

## 2025-04-10 DIAGNOSIS — M05.79 SEROPOSITIVE RHEUMATOID ARTHRITIS OF MULTIPLE SITES: Chronic | ICD-10-CM

## 2025-04-10 RX ORDER — MELOXICAM 15 MG/1
15 TABLET ORAL DAILY
Qty: 90 TABLET | Refills: 1 | Status: SHIPPED | OUTPATIENT
Start: 2025-04-10

## 2025-04-21 ENCOUNTER — SPECIALTY PHARMACY (OUTPATIENT)
Age: 65
End: 2025-04-21
Payer: MEDICAID

## 2025-04-21 NOTE — PROGRESS NOTES
Specialty Pharmacy Patient Management Program  Refill Outreach     Jessica was contacted today regarding refills of their medication(s). Cimzia     Refill Questions      Flowsheet Row Most Recent Value   Changes to allergies? No   Changes to medications? No   New conditions or infections since last clinic visit No   Unplanned office visit, urgent care, ED, or hospital admission in the last 4 weeks  No   How does patient/caregiver feel medication is working? Good   Financial problems or insurance changes  No   Since the previous refill, were any specialty medication doses or scheduled injections missed or delayed?  No   Does this patient require a clinical escalation to a pharmacist? No            Delivery Questions      Flowsheet Row Most Recent Value   Delivery method UPS   Delivery address verified with patient/caregiver? Yes   Delivery address Home   Number of medications in delivery 1   Medication(s) being filled and delivered Certolizumab Pegol (Cimzia (2 Syringe))   Doses left of specialty medications 2   Copay verified? Yes   Copay amount 0   Copay form of payment No copayment ($0)   Delivery Date Selection 04/22/25   Signature Required Yes                 Follow-up: 21 day(s)     Madeleine Jimenez, Pharmacy Technician  4/21/2025  10:06 EDT

## 2025-05-14 ENCOUNTER — SPECIALTY PHARMACY (OUTPATIENT)
Facility: HOSPITAL | Age: 65
End: 2025-05-14
Payer: MEDICAID

## 2025-05-14 RX ORDER — CERTOLIZUMAB PEGOL 200 MG/ML
400 INJECTION, SOLUTION SUBCUTANEOUS
Qty: 2 ML | Refills: 1 | Status: SHIPPED | OUTPATIENT
Start: 2025-05-14

## 2025-05-14 NOTE — PROGRESS NOTES
Specialty Pharmacy Patient Management Program  Per Protocol Prescription Order/Refill     Patient currently fills medications at [facility] and is enrolled in an Rheumatology Patient Management Program.     Requested Prescriptions     Signed Prescriptions Disp Refills    Certolizumab Pegol (Cimzia, 2 Syringe,) 200 MG/ML Prefilled Syringe Kit injection 2 mL 1     Sig: Inject 2 mL under the skin into the appropriate area as directed Every 28 (Twenty-Eight) Days.     Prescription orders above were sent to the pharmacy per Collaborative Care Agreement Protocol.

## 2025-05-27 DIAGNOSIS — M05.79 SEROPOSITIVE RHEUMATOID ARTHRITIS OF MULTIPLE SITES: Chronic | ICD-10-CM

## 2025-05-27 DIAGNOSIS — M17.0 PRIMARY OSTEOARTHRITIS OF BOTH KNEES: Chronic | ICD-10-CM

## 2025-05-27 RX ORDER — TRAMADOL HYDROCHLORIDE 50 MG/1
TABLET ORAL
Qty: 180 TABLET | Refills: 2 | Status: SHIPPED | OUTPATIENT
Start: 2025-05-27

## 2025-05-27 RX ORDER — TRAMADOL HYDROCHLORIDE 50 MG/1
TABLET ORAL
Qty: 180 TABLET | Refills: 0 | Status: CANCELLED | OUTPATIENT
Start: 2025-05-27

## 2025-05-27 NOTE — TELEPHONE ENCOUNTER
Caller: Jessica De Jesus    Relationship: Self    Best call back number: [phone]     Requested Prescriptions:   Requested Prescriptions     Pending Prescriptions Disp Refills    traMADol (ULTRAM) 50 MG tablet 180 tablet 0     Sig: Take 1-2 tablets po TID prn pain        Pharmacy where request should be sent:      Last office visit with prescribing clinician: Visit date not found   Last telemedicine visit with prescribing clinician: Visit date not found   Next office visit with prescribing clinician: 6/17/2025     Additional details provided by patient:     Does the patient have less than a 3 day supply:  [x] Yes  [] No      Yordy Jj Rep   05/27/25 11:24 EDT

## 2025-05-28 NOTE — TELEPHONE ENCOUNTER
Caller: Jessica De Jesus    Relationship to patient: Self    Best call back number: [phone]    Patient is needing: PT CALLED AND SAID THE REFILL WAS STILL GOING TO COST HER $11 AND SHE CAN'T PAY FOR THAT. PLEASE ADVISE. PT WOULD LIKE A CALL BACK.

## 2025-05-28 NOTE — TELEPHONE ENCOUNTER
Tramadol had previously been denied by patient's insurance and she had been paying cash price for it. I submitted a new PA in [facility], key [id number], and received approval 5/28/25-11/28/25. [facility] Patient notified of approval.

## 2025-05-29 ENCOUNTER — SPECIALTY PHARMACY (OUTPATIENT)
Facility: HOSPITAL | Age: 65
End: 2025-05-29
Payer: MEDICAID

## 2025-05-29 ENCOUNTER — SPECIALTY PHARMACY (OUTPATIENT)
Age: 65
End: 2025-05-29
Payer: MEDICAID

## 2025-05-29 NOTE — PROGRESS NOTES
Specialty Pharmacy Patient Management Program  Refill Outreach     Jessica was contacted today regarding refills of their medication(s).    Refill Questions      Flowsheet Row Most Recent Value   Changes to allergies? No   Changes to medications? No   New conditions or infections since last clinic visit No   Unplanned office visit, urgent care, ED, or hospital admission in the last 4 weeks  No   How does patient/caregiver feel medication is working? Good   Financial problems or insurance changes  No   Since the previous refill, were any specialty medication doses or scheduled injections missed or delayed?  No   Does this patient require a clinical escalation to a pharmacist? No            Delivery Questions      Flowsheet Row Most Recent Value   Delivery method UPS   Delivery address verified with patient/caregiver? Yes   Delivery address Home   Number of medications in delivery 1   Medication(s) being filled and delivered Certolizumab Pegol (Cimzia (2 Syringe))   Doses left of specialty medications 1   Copay verified? Yes   Copay amount 0   Copay form of payment No copayment ($0)   Delivery Date Selection 05/30/25   Signature Required No   Do you consent to receive electronic handouts?  No                 Follow-up: 21 day(s)     Renee Serrano, Pharmacy Technician  5/29/2025  09:41 EDT

## 2025-05-29 NOTE — PROGRESS NOTES
Specialty Pharmacy Patient Management Program  Rheumatology Reassessment     Jessica De Jesus was referred by an Rheumatology provider to the Rheumatology Patient Management program offered by [facility] for Rheumatoid arthritis. A follow-up outreach was conducted, including assessment of continued therapy appropriateness, medication adherence, and side effect incidence and management for Cimzia.    Changes to Insurance Coverage or Financial Support  No changes    Relevant Past Medical History and Comorbidities  Relevant medical history and concomitant health conditions were discussed with the patient. The patient's chart has been reviewed for relevant past medical history and comorbid health conditions and updated as necessary.   Past Medical History:   Diagnosis Date    Back pain     Chronic fatigue     H/O high risk medication treatment     Long-term use of immunosuppressant medication     Pain management     Rheumatoid arthritis     Thoracic back pain      Social History     Socioeconomic History    Marital status: Single   Tobacco Use    Smoking status: Never    Smokeless tobacco: Never   Vaping Use    Vaping status: Never Used   Substance and Sexual Activity    Alcohol use: Never    Drug use: Never    Sexual activity: Defer     Problem list reviewed by Susanne Matias PharmD on 5/29/2025 at  9:46 AM    Hospitalizations and Urgent Care Since Last Assessment  ED Visits, Admissions, or Hospitalizations: no  Urgent Office Visits: patient has had to go to the [facility] twice for UTIs, they always clear up good on antibiotics    Allergies  Known allergies and reactions were discussed with the patient. The patient's chart has been reviewed for allergy information and updated as necessary.   No Known Allergies  Allergies reviewed by Susanne Matias PharmD on 5/29/2025 at  9:46 AM    Relevant Laboratory Values  Relevant laboratory values were discussed with the patient. The following specialty  "medication dose adjustment(s) are recommended:     No results found for: \"HGBA1C\"  No results found for: \"GLUCOSE\", \"CALCIUM\", \"NA\", \"K\", \"CO2\", \"CL\", \"BUN\", \"CREATININE\", \"EGFRIFAFRI\", \"EGFRIFNONA\", \"BCR\", \"ANIONGAP\"  No results found for: \"CHOL\", \"CHLPL\", \"TRIG\", \"HDL\", \"LDL\", \"LDLDIRECT\"    Current Medication List  This medication list has been reviewed with the patient and evaluated for any interactions or necessary modifications/recommendations, and updated to include all prescription medications, OTC medications, and supplements the patient is currently taking.  This list reflects what is contained in the patient's profile, which has also been marked as reviewed to communicate to other providers it is the most up to date version of the patient's current medication therapy.     Current Outpatient Medications:     albuterol sulfate  (90 Base) MCG/ACT inhaler, Inhale 2 puffs Every 4 (Four) to 6 (Six) Hours. As needed, Disp: , Rfl:     atorvastatin (LIPITOR) 10 MG tablet, Take 1 tablet by mouth Daily., Disp: , Rfl:     baclofen (LIORESAL) 10 MG tablet, Take 1 tablet by mouth 3 (Three) Times a Day As Needed for Muscle Spasms., Disp: 90 tablet, Rfl: 5    bisacodyl (bisacodyl) 5 MG EC tablet, Take 1 tablet by mouth Daily., Disp: 4 tablet, Rfl: 0    Certolizumab Pegol (Cimzia, 2 Syringe,) 200 MG/ML Prefilled Syringe Kit injection, Inject 2 mL under the skin into the appropriate area as directed Every 28 (Twenty-Eight) Days., Disp: 2 mL, Rfl: 1    cetirizine (ZyrTEC Allergy) 10 MG tablet, Take 1 tablet by mouth Daily., Disp: , Rfl:     Diclofenac Sodium (VOLTAREN) 1 % gel gel, Apply 2 g topically to the appropriate area as directed 2 (Two) Times a Day., Disp: 300 g, Rfl: 2    fluticasone (Flonase Allergy Relief) 50 MCG/ACT nasal spray, Administer 1-2 sprays into the nostril(s) as directed by provider Daily., Disp: , Rfl:     gabapentin (NEURONTIN) 600 MG tablet, Take 1 tablet by mouth 3 (Three) Times a Day., " Disp: , Rfl:     ipratropium (ATROVENT) 0.06 % nasal spray, Administer 2 sprays into the nostril(s) as directed by provider 3 (Three) Times a Day. As needed, Disp: , Rfl:     lisinopril (PRINIVIL,ZESTRIL) 10 MG tablet, Take 1 tablet by mouth Every 12 (Twelve) Hours., Disp: , Rfl:     meloxicam (MOBIC) 15 MG tablet, TAKE ONE (1) TABLET BY MOUTH EVERY DAY, Disp: 90 tablet, Rfl: 1    montelukast (SINGULAIR) 10 MG tablet, Take 1 tablet by mouth Every Night., Disp: , Rfl:     multivitamin (MULTI VITAMIN PO), Take  by mouth Daily., Disp: , Rfl:     oxybutynin XL (Ditropan XL) 10 MG 24 hr tablet, Take 1 tablet by mouth Daily., Disp: , Rfl:     pantoprazole (PROTONIX) 40 MG EC tablet, Take 1 tablet by mouth Daily., Disp: , Rfl:     sertraline (Zoloft) 100 MG tablet, Take 1 tablet by mouth Daily., Disp: , Rfl:     traMADol (ULTRAM) 50 MG tablet, TAKE ONE (1) TO TWO (2) TABLETS BY MOUTH THREE (3) TIMES DAILY AS NEEDED FOR PAIN, Disp: 180 tablet, Rfl: 2    Medicines reviewed by Susanne Matias, PharmD on 5/29/2025 at  9:46 AM    Drug Interactions  No medication interactions     Adverse Drug Reactions  Medication tolerability: Tolerating with no to minimal ADRs  Medication plan: Continue therapy with normal follow-up  Plan for ADR Management: no real ADRs other than patient feeling tired a day or so after her injection    Adherence, Self-Administration, and Current Therapy Problems  Adherence related to the patient's specialty therapy was discussed with the patient. The Adherence segment of this outreach has been reviewed and updated.     Adherence Questions  Linked Medication(s) Assessed: Certolizumab Pegol (Cimzia (2 Syringe))  On average, how many doses/injections does the patient miss per month?: 0  What are the identified reasons for non-adherence or missed doses? : no problems identified  What is the estimated medication adherence level?: %  Based on the patient/caregiver response and refill history, does this  patient require an MTP to track adherence improvements?: no    Additional Barriers to Patient Self-Administration: no  Methods for Supporting Patient Self-Administration: no    Open Medication Therapy Problems  No medication therapy recommendations to display    Goals of Therapy  Goals related to the patient's specialty therapy were discussed with the patient. The Patient Goals segment of this outreach has been reviewed and updated.   Goals Addressed Today    None         Quality of Life Assessment   Quality of Life related to the patient's enrollment in the patient management program and services provided was discussed with the patient. The QOL segment of this outreach has been reviewed and updated.  Quality of Life Improvement Scale: 10-Significantly better    Reassessment Plan & Follow-Up  1. Medication Therapy Changes: Cimzia 400mg every 28 days  2. Related Plans, Therapy Recommendations, or Issues to Be Addressed: no questions or concerns at this time  3. Pharmacist to perform regular assessments no more than (6) months from the previous assessment.  4. Care Coordinator to set up future refill outreaches, coordinate prescription delivery, and escalate clinical questions to pharmacist.    Attestation  Therapeutic appropriateness: Appropriate   I attest the patient was actively involved in and has agreed to the above plan of care.  If the prescribed therapy is at any point deemed not appropriate based on the current or future assessments, a consultation will be initiated with the patient's specialty care provider to determine the best course of action. The revised plan of therapy will be documented along with any required assessments and/or additional patient education provided.     Susanne Matias, PharmD, BCPS  Clinical Specialty Pharmacist, Rheumatology   5/29/2025  09:52 EDT

## 2025-06-17 ENCOUNTER — SPECIALTY PHARMACY (OUTPATIENT)
Age: 65
End: 2025-06-17
Payer: MEDICAID

## 2025-06-20 ENCOUNTER — SPECIALTY PHARMACY (OUTPATIENT)
Age: 65
End: 2025-06-20
Payer: MEDICAID

## 2025-06-20 NOTE — PROGRESS NOTES
Specialty Pharmacy Patient Management Program  Refill Outreach     Jessica was contacted today regarding refills of their medication(s). CIMZIA    Refill Questions      Flowsheet Row Most Recent Value   Changes to allergies? No   Changes to medications? No   New conditions or infections since last clinic visit No   Unplanned office visit, urgent care, ED, or hospital admission in the last 4 weeks  No   How does patient/caregiver feel medication is working? Good   Financial problems or insurance changes  No   Since the previous refill, were any specialty medication doses or scheduled injections missed or delayed?  No   Does this patient require a clinical escalation to a pharmacist? No            Delivery Questions      Flowsheet Row Most Recent Value   Delivery method UPS   Delivery address verified with patient/caregiver? Yes   Delivery address Home   Number of medications in delivery 1   Medication(s) being filled and delivered Certolizumab Pegol (Cimzia (2 Syringe))   Doses left of specialty medications 1   Copay verified? Yes   Copay amount 0   Copay form of payment No copayment ($0)   Delivery Date Selection 06/25/25   Signature Required Yes   Do you consent to receive electronic handouts?  Yes                 Follow-up: 21 day(s)     Madeleine Jimenez, Pharmacy Technician  6/20/2025  12:58 EDT

## 2025-06-21 NOTE — PROGRESS NOTES
Office Visit       Date: 06/24/2025   Patient Name: Jessica De Jesus  MRN: [id number]  YOB: 1960    Referring Physician: No ref. provider found     Chief Complaint:   Chief Complaint   Patient presents with    Rheumatoid Arthritis       History of Present Illness: Jessica De Jesus is a 64 y.o. female who is here today for follow-up of rheumatoid arthritis.      Today She  rates Her  pain 6/10 with 90 minutes of early morning stiffness.  Today she reports feeling worse.    We prescribed her Cimzia,  Voltaren gel, meloxicam 15 mg daily and tramadol    3 times daily as needed joint pain mg as well.  She is prescribed gabapentin through her primary care provider    She reports that she was trying to do some exercising and this caused back.   Additionally her left arm has been painful throughout her forearm.     She reports that sometimes her Cimzia makes her feel poorly and fatigued.     She is a previous patient of Dr. Reynoso and then Dr. Quinn.     Subjective   Review of Systems:  Positive for fatigue, runny nose, sinus pressure, dry mouth, eye itching, neck pain, neck stiffness, back pain    Past Medical History:   Past Medical History:   Diagnosis Date    Back pain     Chronic fatigue     H/O high risk medication treatment     Long-term use of immunosuppressant medication     Pain management     Rheumatoid arthritis     Thoracic back pain        Past Surgical History: History reviewed. No pertinent surgical history.    Family History: History reviewed. No pertinent family history.    Social History:   Social History     Socioeconomic History    Marital status: Single   Tobacco Use    Smoking status: Never    Smokeless tobacco: Never   Vaping Use    Vaping status: Never Used   Substance and Sexual Activity    Alcohol use: Never    Drug use: Never    Sexual activity: Defer       Medications:   Current Outpatient Medications:     albuterol sulfate  (90 Base) MCG/ACT inhaler, Inhale 2  puffs Every 4 (Four) to 6 (Six) Hours. As needed, Disp: , Rfl:     atorvastatin (LIPITOR) 10 MG tablet, Take 1 tablet by mouth Daily., Disp: , Rfl:     baclofen (LIORESAL) 10 MG tablet, Take 1 tablet by mouth 3 (Three) Times a Day As Needed for Muscle Spasms., Disp: 90 tablet, Rfl: 5    bisacodyl (bisacodyl) 5 MG EC tablet, Take 1 tablet by mouth Daily., Disp: 4 tablet, Rfl: 0    Certolizumab Pegol (Cimzia, 2 Syringe,) 200 MG/ML Prefilled Syringe Kit injection, Inject 2 mL under the skin into the appropriate area as directed Every 28 (Twenty-Eight) Days., Disp: 2 mL, Rfl: 1    cetirizine (ZyrTEC Allergy) 10 MG tablet, Take 1 tablet by mouth Daily., Disp: , Rfl:     Diclofenac Sodium (VOLTAREN) 1 % gel gel, Apply 2 g topically to the appropriate area as directed 2 (Two) Times a Day., Disp: 300 g, Rfl: 2    fluticasone (Flonase Allergy Relief) 50 MCG/ACT nasal spray, Administer 1-2 sprays into the nostril(s) as directed by provider Daily., Disp: , Rfl:     gabapentin (NEURONTIN) 600 MG tablet, Take 1 tablet by mouth 3 (Three) Times a Day., Disp: , Rfl:     ipratropium (ATROVENT) 0.06 % nasal spray, Administer 2 sprays into the nostril(s) as directed by provider 3 (Three) Times a Day. As needed, Disp: , Rfl:     lisinopril (PRINIVIL,ZESTRIL) 10 MG tablet, Take 1 tablet by mouth Every 12 (Twelve) Hours., Disp: , Rfl:     meloxicam (MOBIC) 15 MG tablet, Take 1 tablet by mouth Daily., Disp: 90 tablet, Rfl: 1    montelukast (SINGULAIR) 10 MG tablet, Take 1 tablet by mouth Every Night., Disp: , Rfl:     multivitamin (MULTI VITAMIN PO), Take  by mouth Daily., Disp: , Rfl:     olopatadine (PATADAY) 0.2 % solution ophthalmic solution, instill one (1) drop into both eyes once a day, Disp: , Rfl:     omeprazole (priLOSEC) 40 MG capsule, Take 1 tablet by mouth Daily., Disp: , Rfl:     oxybutynin XL (Ditropan XL) 10 MG 24 hr tablet, Take 1 tablet by mouth Daily., Disp: , Rfl:     sertraline (Zoloft) 100 MG tablet, Take 1 tablet by  "mouth Daily., Disp: , Rfl:     traMADol (ULTRAM) 50 MG tablet, TAKE ONE (1) TO TWO (2) TABLETS BY MOUTH THREE (3) TIMES DAILY AS NEEDED FOR PAIN, Disp: 180 tablet, Rfl: 2    Allergies: No Known Allergies    I have reviewed and updated the patient's chief complaint, history of present illness, review of systems, past medical history, surgical history, family history, social history, medications and allergy list as appropriate.     Objective    Vital Signs:   Vitals:    06/24/25 1107   BP: 128/88   Pulse: 64   Resp: 8   Weight: 50.5 kg (111 lb 4.8 oz)   Height: 172.7 cm (68\")   PainSc: 5        Body mass index is 16.92 kg/m².   BMI is within normal parameters. No other follow-up for BMI required.       Physical Exam:  Physical Exam  Vitals reviewed.   Constitutional:       Appearance: Normal appearance.   HENT:      Head: Normocephalic and atraumatic.      Mouth/Throat:      Mouth: Mucous membranes are moist.   Eyes:      Conjunctiva/sclera: Conjunctivae normal.   Cardiovascular:      Rate and Rhythm: Normal rate and regular rhythm.      Pulses: Normal pulses.      Heart sounds: Normal heart sounds.   Pulmonary:      Effort: Pulmonary effort is normal.      Breath sounds: Normal breath sounds.   Musculoskeletal:         General: Normal range of motion.      Cervical back: Normal range of motion and neck supple.      Comments: Scoliosis to the right  Heberden's right hand  Crepitus bilateral knees  No synovitis  Tender throughout her shoulders and neck.    Skin:     General: Skin is warm and dry.   Neurological:      General: No focal deficit present.      Mental Status: She is alert and oriented to person, place, and time. Mental status is at baseline.   Psychiatric:         Mood and Affect: Mood normal.         Behavior: Behavior normal.         Thought Content: Thought content normal.         Judgment: Judgment normal.        Assessment / Plan      Assessment & Plan  Seropositive rheumatoid arthritis of multiple " sites  DX Dr Nidia DYSON 2007- started pred and plaquenil. Rheumatoid Factor >100.   1/2007 MTX  6/2008 Humira and dc plaquenil  2010-Humira was from the company and she stopped methotrexate because she couldn't afford it.  Started low-dose prednisone for a short time.  Restarted mtx.  8/2010 dc Humira- rash.    Arava was stopped in 2017 due to elevated LFTS    CURRENT TREATMENT : CIMZIA subQ monthly (about 2017)    - continue Cimzia monthly at home injections.  - No synovitis today.    - Pain score driven by back pain     Primary osteoarthritis of both knees  Left worse than the right  - She has moved to a one story house.  - we referred her to PT previously but she did not go  - continue tramadol, meloxicam, and Tylenol for pain.  - Tramadol has been denied by her insurance.  She has been paying cash price.  - Also using topical Voltaren gel  Orders:    meloxicam (MOBIC) 15 MG tablet; Take 1 tablet by mouth Daily.     Other idiopathic scoliosis, unspecified spinal region  She went to two PT appointments.  We reordered PT in the past, she would like a new order today.   Baclofen has been helpful.    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    Rarely, cimzia can contribute to a peripheral neuropathy  She does not have known diabetes.  She does have some DDD in her lumbar spine    - She previously was going to have an EMG with her PCP. She reports she did not ever have this done. She declined order for EMG today  - she does have a positive SSA with sicca symptoms  - already on gabapentin from PCP          Encounter for long-term (current) use of high-risk medication  Cimzia    - Labs including CBC, CMP, ESR, and CRP every 6  months  Lab order given today   - QTB and hepatitis panel - 12/21/2023.         Chronic fatigue  Fatigue continues to be a problem- she notices increased fatigue at times after her Cimzia.         I attest that the documentation was copied from a previous note but is still current and accurate.     Follow Up:   Return in about 3 months (around 9/24/2025) for SD Bennett, Cristina Thomas.    LAURA Hernández    [facility] [place]

## 2025-06-24 ENCOUNTER — OFFICE VISIT (OUTPATIENT)
Age: 65
End: 2025-06-24
Payer: MEDICAID

## 2025-06-24 VITALS
HEART RATE: 64 BPM | SYSTOLIC BLOOD PRESSURE: 128 MMHG | WEIGHT: 111.3 LBS | RESPIRATION RATE: 8 BRPM | BODY MASS INDEX: 16.87 KG/M2 | HEIGHT: 68 IN | DIASTOLIC BLOOD PRESSURE: 88 MMHG

## 2025-06-24 DIAGNOSIS — Z79.899 ENCOUNTER FOR LONG-TERM (CURRENT) USE OF HIGH-RISK MEDICATION: Chronic | ICD-10-CM

## 2025-06-24 DIAGNOSIS — M17.0 PRIMARY OSTEOARTHRITIS OF BOTH KNEES: Chronic | ICD-10-CM

## 2025-06-24 DIAGNOSIS — R52 PAIN MANAGEMENT: ICD-10-CM

## 2025-06-24 DIAGNOSIS — M41.20 OTHER IDIOPATHIC SCOLIOSIS, UNSPECIFIED SPINAL REGION: Chronic | ICD-10-CM

## 2025-06-24 DIAGNOSIS — M05.79 SEROPOSITIVE RHEUMATOID ARTHRITIS OF MULTIPLE SITES: Primary | Chronic | ICD-10-CM

## 2025-06-24 DIAGNOSIS — G62.9 NEUROPATHY: ICD-10-CM

## 2025-06-24 DIAGNOSIS — R53.82 CHRONIC FATIGUE: ICD-10-CM

## 2025-06-24 PROCEDURE — 99214 OFFICE O/P EST MOD 30 MIN: CPT

## 2025-06-24 PROCEDURE — 1159F MED LIST DOCD IN RCRD: CPT

## 2025-06-24 PROCEDURE — 1160F RVW MEDS BY RX/DR IN RCRD: CPT

## 2025-06-24 RX ORDER — BACLOFEN 10 MG/1
10 TABLET ORAL 3 TIMES DAILY PRN
Qty: 90 TABLET | Refills: 5 | Status: SHIPPED | OUTPATIENT
Start: 2025-06-24

## 2025-06-24 RX ORDER — OMEPRAZOLE 40 MG/1
1 CAPSULE, DELAYED RELEASE ORAL DAILY
COMMUNITY
Start: 2025-05-02

## 2025-06-24 RX ORDER — MELOXICAM 15 MG/1
15 TABLET ORAL DAILY
Qty: 90 TABLET | Refills: 1 | Status: SHIPPED | OUTPATIENT
Start: 2025-06-24

## 2025-06-24 RX ORDER — OLOPATADINE HYDROCHLORIDE 2 MG/ML
SOLUTION OPHTHALMIC
COMMUNITY
Start: 2025-05-27

## 2025-06-24 NOTE — ASSESSMENT & PLAN NOTE
Cimzia    - Labs including CBC, CMP, ESR, and CRP every 6 months  Lab order given today   - QTB and hepatitis panel - 12/21/2023.

## 2025-06-24 NOTE — ASSESSMENT & PLAN NOTE
Tramadol    UDS up to date 4/29/25  Pain contract reviewed and signed 6/2025  PDMP reviewed   No signs of abuse or diversion  Will address pain management at every visit

## 2025-06-24 NOTE — ASSESSMENT & PLAN NOTE
Fatigue continues to be a problem- she notices increased fatigue at times after her Cimzia.

## 2025-06-24 NOTE — ASSESSMENT & PLAN NOTE
secondary sjogrens/RA could cause some neuropathy.    Rarely, cimzia can contribute to a peripheral neuropathy  She does not have known diabetes.  She does have some DDD in her lumbar spine    - She previously was going to have an EMG with her PCP. She reports she did not ever have this done. She declined order for EMG today  - she does have a positive SSA with sicca symptoms  - already on gabapentin from PCP

## 2025-06-24 NOTE — ASSESSMENT & PLAN NOTE
She went to two PT appointments.  We reordered PT in the past, she would like a new order today.   Baclofen has been helpful.    Orders:    Ambulatory Referral to Physical Therapy for Evaluation & Treatment

## 2025-06-24 NOTE — ASSESSMENT & PLAN NOTE
DX Dr Jacob RA 2007- started pred and plaquenil. Rheumatoid Factor >100.   1/2007 MTX  6/2008 Humira and dc plaquenil  2010-Humira was from the company and she stopped methotrexate because she couldn't afford it.  Started low-dose prednisone for a short time.  Restarted mtx.  8/2010 dc Humira- rash.    Arava was stopped in 2017 due to elevated LFTS    CURRENT TREATMENT : CIMZIA subQ monthly (about 2017)    - continue Cimzia monthly at home injections.  - No synovitis today.    - Pain score driven by back pain

## 2025-06-24 NOTE — ASSESSMENT & PLAN NOTE
Left worse than the right  - She has moved to a one story house.  - we referred her to PT previously but she did not go  - continue tramadol, meloxicam, and Tylenol for pain.  - Tramadol has been denied by her insurance.  She has been paying cash price.  - Also using topical Voltaren gel  Orders:    meloxicam (MOBIC) 15 MG tablet; Take 1 tablet by mouth Daily.

## 2025-07-15 ENCOUNTER — SPECIALTY PHARMACY (OUTPATIENT)
Age: 65
End: 2025-07-15
Payer: MEDICAID

## 2025-07-15 ENCOUNTER — SPECIALTY PHARMACY (OUTPATIENT)
Facility: HOSPITAL | Age: 65
End: 2025-07-15
Payer: MEDICAID

## 2025-07-15 RX ORDER — CERTOLIZUMAB PEGOL 200 MG/ML
400 INJECTION, SOLUTION SUBCUTANEOUS
Qty: 2 ML | Refills: 5 | Status: SHIPPED | OUTPATIENT
Start: 2025-07-15

## 2025-07-15 NOTE — PROGRESS NOTES
Specialty Pharmacy Patient Management Program  Per Protocol Prescription Order/Refill     Patient currently fills medications at [facility] and is enrolled in an Rheumatology Patient Management Program.     Requested Prescriptions     Signed Prescriptions Disp Refills    Certolizumab Pegol (Cimzia, 2 Syringe,) 200 MG/ML Prefilled Syringe Kit injection 2 mL 5     Sig: Inject 2 mL under the skin into the appropriate area as directed Every 28 (Twenty-Eight) Days.     Prescription orders above were sent to the pharmacy per Collaborative Care Agreement Protocol.

## 2025-07-17 ENCOUNTER — SPECIALTY PHARMACY (OUTPATIENT)
Age: 65
End: 2025-07-17
Payer: MEDICAID

## 2025-07-21 ENCOUNTER — TELEPHONE (OUTPATIENT)
Age: 65
End: 2025-07-21
Payer: MEDICAID

## 2025-07-21 DIAGNOSIS — M17.0 PRIMARY OSTEOARTHRITIS OF BOTH KNEES: Chronic | ICD-10-CM

## 2025-07-21 DIAGNOSIS — M05.79 SEROPOSITIVE RHEUMATOID ARTHRITIS OF MULTIPLE SITES: Chronic | ICD-10-CM

## 2025-07-21 RX ORDER — TRAMADOL HYDROCHLORIDE 50 MG/1
TABLET ORAL
Qty: 180 TABLET | Refills: 2 | Status: SHIPPED | OUTPATIENT
Start: 2025-07-21

## 2025-07-21 NOTE — TELEPHONE ENCOUNTER
Refill request for Tramadol.  Rx sent on 5/27/25 for qty 180 with 2 refills, but pt changed pharmacies and needs a new rx sent to [facility].  RTC is 9/23/25 and UDS was on 9/18/24.  I will pend and send to EMANUEL Bennett for approval. -THOMAS EtienneA

## 2025-07-21 NOTE — TELEPHONE ENCOUNTER
Incoming Refill Request      Medication requested (name and dose): traMADol (ULTRAM) 50 MG tablet     Pharmacy where request should be sent: [email or web address]    Additional details provided by patient: PT HAS SWITCHED PHARMACY'S     Best call back number: [phone]    Does the patient have less than a 3 day supply:  [x] Yes  [] No    Yordy Biswas Rep  07/21/25, 08:36 EDT

## 2025-07-22 ENCOUNTER — SPECIALTY PHARMACY (OUTPATIENT)
Age: 65
End: 2025-07-22
Payer: MEDICAID

## 2025-07-22 NOTE — PROGRESS NOTES
Specialty Pharmacy Patient Management Program  Refill Outreach     Jessica was contacted today regarding refills of their medication(s).    Refill Questions      Flowsheet Row Most Recent Value   Changes to allergies? No   Changes to medications? Yes  [started allergy injections again]   New conditions or infections since last clinic visit No   Unplanned office visit, urgent care, ED, or hospital admission in the last 4 weeks  No   How does patient/caregiver feel medication is working? Fair  [pt feels as though the Cimzia might be causing her to feel bad]   Financial problems or insurance changes  No   Since the previous refill, were any specialty medication doses or scheduled injections missed or delayed?  No   Does this patient require a clinical escalation to a pharmacist? Yes  [pt feels as though the Cimzia might be causing her to feel bad]            Delivery Questions      Flowsheet Row Most Recent Value   Delivery method UPS   Delivery address verified with patient/caregiver? Yes   Delivery address Home   Number of medications in delivery 1   Medication(s) being filled and delivered Certolizumab Pegol (Cimzia (2 Syringe))   Doses left of specialty medications 1   Copay verified? Yes   Copay amount 0   Copay form of payment No copayment ($0)   Delivery Date Selection 07/23/25   Signature Required No   Do you consent to receive electronic handouts?  No                 Follow-up: 21 day(s)     Renee Serrano, Pharmacy Technician  7/22/2025  09:33 EDT

## 2025-07-23 ENCOUNTER — TELEPHONE (OUTPATIENT)
Age: 65
End: 2025-07-23
Payer: MEDICAID

## 2025-07-23 DIAGNOSIS — M41.20 OTHER IDIOPATHIC SCOLIOSIS, UNSPECIFIED SPINAL REGION: Primary | ICD-10-CM

## 2025-07-23 NOTE — TELEPHONE ENCOUNTER
Patient said the order for her PT has dropped and that the PT told her the order needs to be resubmitted.

## 2025-07-25 NOTE — TELEPHONE ENCOUNTER
Please see message below as Cristina is out of the office Monday as well.  Thank you. -CECILE Etienne

## 2025-07-25 NOTE — TELEPHONE ENCOUNTER
I spoke with pt. She has PT on Monday at 10:00 at [facility] and [facility].  She is needing a new order faxed to them as her current order has .  Their phone number is [phone]. Can you please put in a new order?  Thank you. -CECILE Etienne

## 2025-07-28 ENCOUNTER — SPECIALTY PHARMACY (OUTPATIENT)
Facility: HOSPITAL | Age: 65
End: 2025-07-28
Payer: MEDICAID

## 2025-07-29 NOTE — TELEPHONE ENCOUNTER
I faxed a new PT order to [phone] and I informed pt that PT order had been sent. -CECILE Etienne

## 2025-08-07 ENCOUNTER — TELEPHONE (OUTPATIENT)
Age: 65
End: 2025-08-07
Payer: MEDICAID

## 2025-08-11 ENCOUNTER — TELEPHONE (OUTPATIENT)
Age: 65
End: 2025-08-11
Payer: MEDICAID

## 2025-08-13 ENCOUNTER — SPECIALTY PHARMACY (OUTPATIENT)
Age: 65
End: 2025-08-13
Payer: MEDICAID